# Patient Record
Sex: FEMALE | Race: WHITE | Employment: OTHER | URBAN - METROPOLITAN AREA
[De-identification: names, ages, dates, MRNs, and addresses within clinical notes are randomized per-mention and may not be internally consistent; named-entity substitution may affect disease eponyms.]

---

## 2019-10-04 ENCOUNTER — APPOINTMENT (OUTPATIENT)
Dept: GENERAL RADIOLOGY | Facility: CLINIC | Age: 69
End: 2019-10-04
Attending: EMERGENCY MEDICINE
Payer: COMMERCIAL

## 2019-10-04 ENCOUNTER — HOSPITAL ENCOUNTER (EMERGENCY)
Facility: CLINIC | Age: 69
Discharge: HOME | End: 2019-10-04
Attending: EMERGENCY MEDICINE | Admitting: EMERGENCY MEDICINE
Payer: COMMERCIAL

## 2019-10-04 ENCOUNTER — APPOINTMENT (OUTPATIENT)
Dept: CT IMAGING | Facility: CLINIC | Age: 69
End: 2019-10-04
Attending: EMERGENCY MEDICINE
Payer: COMMERCIAL

## 2019-10-04 VITALS
OXYGEN SATURATION: 97 % | SYSTOLIC BLOOD PRESSURE: 155 MMHG | TEMPERATURE: 98.3 F | RESPIRATION RATE: 18 BRPM | DIASTOLIC BLOOD PRESSURE: 74 MMHG | HEART RATE: 88 BPM

## 2019-10-04 DIAGNOSIS — R74.8 ELEVATED LIVER ENZYMES: ICD-10-CM

## 2019-10-04 DIAGNOSIS — R74.8 ELEVATED LIPASE: ICD-10-CM

## 2019-10-04 DIAGNOSIS — E80.6 HYPERBILIRUBINEMIA: ICD-10-CM

## 2019-10-04 DIAGNOSIS — K31.89 GASTRIC WALL THICKENING: ICD-10-CM

## 2019-10-04 LAB
ALBUMIN SERPL-MCNC: 3.5 G/DL (ref 3.4–5)
ALP SERPL-CCNC: 291 U/L (ref 40–150)
ALT SERPL W P-5'-P-CCNC: 298 U/L (ref 0–50)
ANION GAP SERPL CALCULATED.3IONS-SCNC: 6 MMOL/L (ref 3–14)
AST SERPL W P-5'-P-CCNC: 428 U/L (ref 0–45)
BASOPHILS # BLD AUTO: 0 10E9/L (ref 0–0.2)
BASOPHILS NFR BLD AUTO: 0.1 %
BILIRUB SERPL-MCNC: 1.4 MG/DL (ref 0.2–1.3)
BUN SERPL-MCNC: 19 MG/DL (ref 7–30)
CALCIUM SERPL-MCNC: 10.1 MG/DL (ref 8.5–10.1)
CHLORIDE SERPL-SCNC: 103 MMOL/L (ref 94–109)
CO2 SERPL-SCNC: 28 MMOL/L (ref 20–32)
CREAT SERPL-MCNC: 0.66 MG/DL (ref 0.52–1.04)
D DIMER PPP FEU-MCNC: 0.3 UG/ML FEU (ref 0–0.5)
DIFFERENTIAL METHOD BLD: ABNORMAL
EOSINOPHIL # BLD AUTO: 0.2 10E9/L (ref 0–0.7)
EOSINOPHIL NFR BLD AUTO: 1.1 %
ERYTHROCYTE [DISTWIDTH] IN BLOOD BY AUTOMATED COUNT: 14.3 % (ref 10–15)
GFR SERPL CREATININE-BSD FRML MDRD: >90 ML/MIN/{1.73_M2}
GLUCOSE SERPL-MCNC: 196 MG/DL (ref 70–99)
HCT VFR BLD AUTO: 44.9 % (ref 35–47)
HGB BLD-MCNC: 14.9 G/DL (ref 11.7–15.7)
IMM GRANULOCYTES # BLD: 0.1 10E9/L (ref 0–0.4)
IMM GRANULOCYTES NFR BLD: 0.8 %
INTERPRETATION ECG - MUSE: NORMAL
LIPASE SERPL-CCNC: 453 U/L (ref 73–393)
LYMPHOCYTES # BLD AUTO: 4 10E9/L (ref 0.8–5.3)
LYMPHOCYTES NFR BLD AUTO: 26.1 %
MCH RBC QN AUTO: 27.8 PG (ref 26.5–33)
MCHC RBC AUTO-ENTMCNC: 33.2 G/DL (ref 31.5–36.5)
MCV RBC AUTO: 84 FL (ref 78–100)
MONOCYTES # BLD AUTO: 0.9 10E9/L (ref 0–1.3)
MONOCYTES NFR BLD AUTO: 5.9 %
NEUTROPHILS # BLD AUTO: 10 10E9/L (ref 1.6–8.3)
NEUTROPHILS NFR BLD AUTO: 66 %
NRBC # BLD AUTO: 0 10*3/UL
NRBC BLD AUTO-RTO: 0 /100
PLATELET # BLD AUTO: 270 10E9/L (ref 150–450)
POTASSIUM SERPL-SCNC: 3.8 MMOL/L (ref 3.4–5.3)
PROT SERPL-MCNC: 7.8 G/DL (ref 6.8–8.8)
RBC # BLD AUTO: 5.36 10E12/L (ref 3.8–5.2)
SODIUM SERPL-SCNC: 137 MMOL/L (ref 133–144)
TROPONIN I SERPL-MCNC: <0.015 UG/L (ref 0–0.04)
WBC # BLD AUTO: 15.1 10E9/L (ref 4–11)

## 2019-10-04 PROCEDURE — 25000128 H RX IP 250 OP 636: Performed by: EMERGENCY MEDICINE

## 2019-10-04 PROCEDURE — 25000125 ZZHC RX 250: Performed by: EMERGENCY MEDICINE

## 2019-10-04 PROCEDURE — 84484 ASSAY OF TROPONIN QUANT: CPT | Performed by: EMERGENCY MEDICINE

## 2019-10-04 PROCEDURE — 85025 COMPLETE CBC W/AUTO DIFF WBC: CPT | Performed by: EMERGENCY MEDICINE

## 2019-10-04 PROCEDURE — 83690 ASSAY OF LIPASE: CPT | Performed by: EMERGENCY MEDICINE

## 2019-10-04 PROCEDURE — 74177 CT ABD & PELVIS W/CONTRAST: CPT

## 2019-10-04 PROCEDURE — 71046 X-RAY EXAM CHEST 2 VIEWS: CPT

## 2019-10-04 PROCEDURE — 85379 FIBRIN DEGRADATION QUANT: CPT | Performed by: EMERGENCY MEDICINE

## 2019-10-04 PROCEDURE — 93005 ELECTROCARDIOGRAM TRACING: CPT

## 2019-10-04 PROCEDURE — 80053 COMPREHEN METABOLIC PANEL: CPT | Performed by: EMERGENCY MEDICINE

## 2019-10-04 PROCEDURE — 99285 EMERGENCY DEPT VISIT HI MDM: CPT | Mod: 25

## 2019-10-04 PROCEDURE — 36415 COLL VENOUS BLD VENIPUNCTURE: CPT | Performed by: EMERGENCY MEDICINE

## 2019-10-04 RX ORDER — LISINOPRIL/HYDROCHLOROTHIAZIDE 10-12.5 MG
1 TABLET ORAL DAILY
COMMUNITY

## 2019-10-04 RX ORDER — ATORVASTATIN CALCIUM 40 MG/1
40 TABLET, FILM COATED ORAL AT BEDTIME
COMMUNITY

## 2019-10-04 RX ORDER — IOPAMIDOL 755 MG/ML
95 INJECTION, SOLUTION INTRAVASCULAR ONCE
Status: COMPLETED | OUTPATIENT
Start: 2019-10-04 | End: 2019-10-04

## 2019-10-04 RX ADMIN — SODIUM CHLORIDE 69 ML: 9 INJECTION, SOLUTION INTRAVENOUS at 20:01

## 2019-10-04 RX ADMIN — IOPAMIDOL 95 ML: 755 INJECTION, SOLUTION INTRAVENOUS at 20:01

## 2019-10-04 ASSESSMENT — ENCOUNTER SYMPTOMS
BACK PAIN: 1
FEVER: 0
UNEXPECTED WEIGHT CHANGE: 0
BLOOD IN STOOL: 0
VOMITING: 0
CHILLS: 0
FATIGUE: 1
LIGHT-HEADEDNESS: 1
DIARRHEA: 0
ABDOMINAL PAIN: 0
NAUSEA: 0
CONSTIPATION: 0
DIAPHORESIS: 1
DYSURIA: 0
NECK PAIN: 1

## 2019-10-04 NOTE — ED TRIAGE NOTES
Pain started on R back ribs, changed to epigastric area, and now has shooting pain from epigastric area to L shoulder with deep breathing. Profusely sweating

## 2019-10-04 NOTE — ED AVS SNAPSHOT
Emergency Department  [address] [place] [id number]  Phone:  [phone]  Fax:  [phone]                                    Natasha Vincent   MRN: [id number]    Department:   Emergency Department   Date of Visit:  10/4/2019           After Visit Summary Signature Page    I have received my discharge instructions, and my questions have been answered. I have discussed any challenges I see with this plan with the nurse or doctor.    ..........................................................................................................................................  Patient/Patient Representative Signature      ..........................................................................................................................................  Patient Representative Print Name and Relationship to Patient    ..................................................               ................................................  Date                                   Time    ..........................................................................................................................................  Reviewed by Signature/Title    ...................................................              ..............................................  Date                                               Time          22EPIC Rev 08/18

## 2019-10-05 NOTE — ED PROVIDER NOTES
History     Chief Complaint:  Back pain     HPI  A family member was used as an  to obtain the below history as well as to explain diagnosis, plan of treatment, reasons to return to the emergency department and appropriate follow up.  Natasha Vincent is a 68 year old years old female with a history of hypertension, hyperlipidemia, and diabetes mellitus who presents with back pain. This morning, the patient awoke to back pain on her right side just inferior to her scapula. She has since had this pain continue and travel around to her chest and her left neck. She has never had pain like this before thus she has visited the ER. Here, She denies reports diaphoresis, lightheadedness and fatigue. She denies any abdominal pain, trauma/fall/injury, nausea, vomiting, fever, chills, diarrhea, constipation, blood in stool, dysuria, or unexpected weight change. She reports a history of a cholecystectomy. The patient is not an avid exerciser.     CARDIAC RISK FACTORS:  Sex:    Female   Tobacco:   Negative   Hypertension:   Positive   Hyperlipidemia:  Positive   Diabetes:   Positive   Family History:  Unknown     PE/DVT RISK FACTORS:  Sex:    Female   Hormones:   Negative   Tobacco:   Negative   Cancer:   Negative   Travel:   Positive, flew from [place]   Surgery:   Negative   Other immobilization: Negative   Personal history:  Negative   Family history:  Negative     Allergies:  Patient has no known allergies.    Medications:    atorvastatin (LIPITOR) 20 MG tablet  insulin aspart (NOVOLOG PEN) 100 UNIT/ML pen  lisinopril-hydrochlorothiazide (PRINZIDE/ZESTORETIC) 10-12.5 MG tablet  metFORMIN (GLUCOPHAGE) 1000 MG tablet     Past Medical History:    Diabetes mellitus   Hyperlipidemia   Hypertension      Past Surgical History:    The patient does not have any pertinent past surgical history.    Family History:    No past pertinent family history.    Social History:  Marital Status:  Unknown   Smoker:   Never    Smokeless:   Never   Alcohol:   None   Drugs:   None   Arrives with:   Family     Review of Systems   Constitutional: Positive for diaphoresis and fatigue. Negative for chills, fever and unexpected weight change.   Cardiovascular: Positive for chest pain.   Gastrointestinal: Negative for abdominal pain, blood in stool, constipation, diarrhea, nausea and vomiting.   Genitourinary: Negative for dysuria.   Musculoskeletal: Positive for back pain and neck pain.   Neurological: Positive for light-headedness.   All other systems reviewed and are negative.    Physical Exam     Patient Vitals for the past 24 hrs:   BP Temp Temp src Pulse Resp SpO2   10/04/19 1706 (!) 155/74 98.3  F (36.8  C) Oral 88 18 97 %     Physical Exam  SKIN:  Warm, dry.  No jaundice.  No ecchymoses about the torso.  HEMATOLOGIC/IMMUNOLOGIC/LYMPHATIC:  No pallor.  No extremity edema.  HENT:  No JVD.  EYES:  Conjunctivae normal.  Anicteric.  CARDIOVASCULAR:  Regular rate and rhythm.  No murmur.  No rub.  RESPIRATORY:  No respiratory distress, breath sounds equal and normal.  GASTROINTESTINAL:  Soft, nontender abdomen with active bowel sounds.  No distention.  No palpable mass.  No hepatosplenomegaly.  MUSCULOSKELETAL: Normal body habitus.  NEUROLOGIC:  Alert, conversant.  PSYCHIATRIC:  Normal mood.    Emergency Department Course   ECG:  Indication: chest pain   Time: 1714  Vent. Rate 84 bpm. FL interval 138. QRS duration 78. QT/QTc 348/411. P-R-T axis 56 70 58. Normal sinus rhythm. Normal ECG. Read time: 1715    Imaging:  Radiographic findings were communicated with the patient and family who voiced understanding of the findings.    XR Chest PA & LAT:   No acute cardiopulmonary disease. as per radiology.     CT Abdomen/Pelvis with IV contrast:   1. Nonspecific fold thickening of the greater curvature of the  stomach. This could represent gastritis. Underlying gastric neoplastic  etiology cannot be excluded based on this exam.  2. No other acute  abnormality. as per radiology.    Laboratory:  CBC: WBC: 15.1, HGB: 14.9, PLT: 270  CMP: Glucose 196, bilirubin 1.4, alkaline phosphate 291, , , o/w WNL (Creatinine: 0.66)  D-Dimer: 0.3  1819 Troponin: <0.015   Lipase: 453    Emergency Department Course:  1724 I performed an exam of the patient as documented above.   1857 I rechecked the patient and discussed the results of their workup thus far.     Findings and plan explained. Patient discharged home with instructions regarding supportive care and reasons to return. The importance of close follow-up was reviewed. I personally reviewed the workup results with them and answered all related questions prior to discharge.    Impression & Plan    Medical Decision Making:  Natasha Vincent is a 68 year old years old female who presents with epigastric, chest, shoulder, and right thoracic pain. Feeling much improved at this point. She states she had a cholecystectomy many years ago and she does have a large healed incision in the right upper quadrant which would correlate with that. Cardiopulmonary testing was relatively unrevealing. No evidence of ACS. Negative d-dimer so pulmonary embolism was not pursued given my relatively low suspicion for that. Given the elevated transaminases, lipase, and white count, with elevated bilirubin, biliary tract or liver disease was a concern. As a result, a CT scan was performed which revealed a thickened gastric wall but nothing else specifically pathologic. Could be nonspecific versus early neoplastic changes of the stomach. In the meantime, the patient continued to improve and had negligible symptoms at the end of the visit so I think she can be discharged for close follow up and I advised follow up with Dr. Marinelli as she will likely need upper endoscopy to define the stomach thickening and the other abnormal test findings. Pending follow up, I advised return here over the weekend if feeling worse or any new concerns.      Diagnosis:    ICD-10-CM    1. Elevated liver enzymes R74.8    2. Elevated lipase R74.8    3. Hyperbilirubinemia E80.6    4. Gastric wall thickening K31.89      Disposition:  discharged to home    Scribe Disclosure:  I, Aroldo Quinteros, am serving as a scribe on 10/4/2019 at 5:24 PM to personally document services performed by Walt Crump MD based on my observations and the provider's statements to me.      Walt Crump MD  10/04/19 7003

## 2019-10-18 ENCOUNTER — HOSPITAL ENCOUNTER (INPATIENT)
Facility: CLINIC | Age: 69
LOS: 3 days | Discharge: HOME | DRG: 438 | End: 2019-10-21
Attending: EMERGENCY MEDICINE | Admitting: INTERNAL MEDICINE
Payer: COMMERCIAL

## 2019-10-18 DIAGNOSIS — K91.89 POST-ERCP ACUTE PANCREATITIS: ICD-10-CM

## 2019-10-18 DIAGNOSIS — K85.10 ACUTE BILIARY PANCREATITIS, UNSPECIFIED COMPLICATION STATUS: ICD-10-CM

## 2019-10-18 DIAGNOSIS — K85.90 POST-ERCP ACUTE PANCREATITIS: ICD-10-CM

## 2019-10-18 LAB
ALBUMIN SERPL-MCNC: 3.2 G/DL (ref 3.4–5)
ALP SERPL-CCNC: 328 U/L (ref 40–150)
ALT SERPL W P-5'-P-CCNC: 313 U/L (ref 0–50)
ANION GAP SERPL CALCULATED.3IONS-SCNC: 7 MMOL/L (ref 3–14)
AST SERPL W P-5'-P-CCNC: 566 U/L (ref 0–45)
BASOPHILS # BLD AUTO: 0 10E9/L (ref 0–0.2)
BASOPHILS NFR BLD AUTO: 0.1 %
BILIRUB SERPL-MCNC: 2.1 MG/DL (ref 0.2–1.3)
BUN SERPL-MCNC: 12 MG/DL (ref 7–30)
CALCIUM SERPL-MCNC: 9 MG/DL (ref 8.5–10.1)
CHLORIDE SERPL-SCNC: 102 MMOL/L (ref 94–109)
CO2 SERPL-SCNC: 27 MMOL/L (ref 20–32)
CREAT SERPL-MCNC: 0.51 MG/DL (ref 0.52–1.04)
DIFFERENTIAL METHOD BLD: ABNORMAL
EOSINOPHIL # BLD AUTO: 0 10E9/L (ref 0–0.7)
EOSINOPHIL NFR BLD AUTO: 0.1 %
ERYTHROCYTE [DISTWIDTH] IN BLOOD BY AUTOMATED COUNT: 13.6 % (ref 10–15)
GFR SERPL CREATININE-BSD FRML MDRD: >90 ML/MIN/{1.73_M2}
GLUCOSE BLDC GLUCOMTR-MCNC: 159 MG/DL (ref 70–99)
GLUCOSE SERPL-MCNC: 193 MG/DL (ref 70–99)
HCT VFR BLD AUTO: 39.7 % (ref 35–47)
HGB BLD-MCNC: 13.3 G/DL (ref 11.7–15.7)
IMM GRANULOCYTES # BLD: 0 10E9/L (ref 0–0.4)
IMM GRANULOCYTES NFR BLD: 0.3 %
LIPASE SERPL-CCNC: 1004 U/L (ref 73–393)
LYMPHOCYTES # BLD AUTO: 0.9 10E9/L (ref 0.8–5.3)
LYMPHOCYTES NFR BLD AUTO: 6.1 %
MCH RBC QN AUTO: 28.5 PG (ref 26.5–33)
MCHC RBC AUTO-ENTMCNC: 33.5 G/DL (ref 31.5–36.5)
MCV RBC AUTO: 85 FL (ref 78–100)
MONOCYTES # BLD AUTO: 0.3 10E9/L (ref 0–1.3)
MONOCYTES NFR BLD AUTO: 2.3 %
NEUTROPHILS # BLD AUTO: 13.1 10E9/L (ref 1.6–8.3)
NEUTROPHILS NFR BLD AUTO: 91.1 %
NRBC # BLD AUTO: 0 10*3/UL
NRBC BLD AUTO-RTO: 0 /100
PLATELET # BLD AUTO: 246 10E9/L (ref 150–450)
POTASSIUM SERPL-SCNC: 3.4 MMOL/L (ref 3.4–5.3)
PROT SERPL-MCNC: 7.3 G/DL (ref 6.8–8.8)
RBC # BLD AUTO: 4.67 10E12/L (ref 3.8–5.2)
SODIUM SERPL-SCNC: 136 MMOL/L (ref 133–144)
WBC # BLD AUTO: 14.4 10E9/L (ref 4–11)

## 2019-10-18 PROCEDURE — 25000128 H RX IP 250 OP 636: Performed by: EMERGENCY MEDICINE

## 2019-10-18 PROCEDURE — 99205 OFFICE O/P NEW HI 60 MIN: CPT | Performed by: INTERNAL MEDICINE

## 2019-10-18 PROCEDURE — 25800030 ZZH RX IP 258 OP 636: Performed by: EMERGENCY MEDICINE

## 2019-10-18 PROCEDURE — 96375 TX/PRO/DX INJ NEW DRUG ADDON: CPT

## 2019-10-18 PROCEDURE — 85025 COMPLETE CBC W/AUTO DIFF WBC: CPT | Performed by: EMERGENCY MEDICINE

## 2019-10-18 PROCEDURE — 83690 ASSAY OF LIPASE: CPT | Performed by: EMERGENCY MEDICINE

## 2019-10-18 PROCEDURE — 99285 EMERGENCY DEPT VISIT HI MDM: CPT | Mod: 25

## 2019-10-18 PROCEDURE — 00000146 ZZHCL STATISTIC GLUCOSE BY METER IP

## 2019-10-18 PROCEDURE — 80053 COMPREHEN METABOLIC PANEL: CPT | Performed by: EMERGENCY MEDICINE

## 2019-10-18 PROCEDURE — 12000000 ZZH R&B MED SURG/OB

## 2019-10-18 PROCEDURE — 96361 HYDRATE IV INFUSION ADD-ON: CPT

## 2019-10-18 PROCEDURE — 96374 THER/PROPH/DIAG INJ IV PUSH: CPT

## 2019-10-18 PROCEDURE — 99207 ZZC CDG-CODE CATEGORY CHANGED: CPT | Performed by: INTERNAL MEDICINE

## 2019-10-18 RX ORDER — ONDANSETRON 2 MG/ML
4 INJECTION INTRAMUSCULAR; INTRAVENOUS EVERY 30 MIN PRN
Status: DISCONTINUED | OUTPATIENT
Start: 2019-10-18 | End: 2019-10-19

## 2019-10-18 RX ORDER — PANTOPRAZOLE SODIUM 40 MG/1
40 TABLET, DELAYED RELEASE ORAL DAILY
COMMUNITY

## 2019-10-18 RX ORDER — HYDROMORPHONE HYDROCHLORIDE 1 MG/ML
0.5 INJECTION, SOLUTION INTRAMUSCULAR; INTRAVENOUS; SUBCUTANEOUS
Status: DISCONTINUED | OUTPATIENT
Start: 2019-10-18 | End: 2019-10-19

## 2019-10-18 RX ORDER — SODIUM CHLORIDE 9 MG/ML
1000 INJECTION, SOLUTION INTRAVENOUS CONTINUOUS
Status: DISCONTINUED | OUTPATIENT
Start: 2019-10-18 | End: 2019-10-19

## 2019-10-18 RX ADMIN — ONDANSETRON 4 MG: 2 INJECTION INTRAMUSCULAR; INTRAVENOUS at 21:08

## 2019-10-18 RX ADMIN — HYDROMORPHONE HYDROCHLORIDE 0.5 MG: 1 INJECTION, SOLUTION INTRAMUSCULAR; INTRAVENOUS; SUBCUTANEOUS at 21:08

## 2019-10-18 RX ADMIN — SODIUM CHLORIDE 1000 ML: 9 INJECTION, SOLUTION INTRAVENOUS at 21:05

## 2019-10-18 RX ADMIN — SODIUM CHLORIDE 1000 ML: 9 INJECTION, SOLUTION INTRAVENOUS at 23:07

## 2019-10-18 ASSESSMENT — ENCOUNTER SYMPTOMS
HEMATURIA: 0
NAUSEA: 1
CHILLS: 0
DYSURIA: 0
VOMITING: 1
ABDOMINAL PAIN: 1
FREQUENCY: 0

## 2019-10-18 ASSESSMENT — MIFFLIN-ST. JEOR: SCORE: 1513.84

## 2019-10-19 ENCOUNTER — APPOINTMENT (OUTPATIENT)
Dept: CT IMAGING | Facility: CLINIC | Age: 69
DRG: 438 | End: 2019-10-19
Attending: INTERNAL MEDICINE
Payer: COMMERCIAL

## 2019-10-19 PROBLEM — K91.89 POST-ERCP ACUTE PANCREATITIS: Status: ACTIVE | Noted: 2019-10-19

## 2019-10-19 PROBLEM — K85.90 POST-ERCP ACUTE PANCREATITIS: Status: ACTIVE | Noted: 2019-10-19

## 2019-10-19 LAB
ALBUMIN SERPL-MCNC: 2.8 G/DL (ref 3.4–5)
ALBUMIN UR-MCNC: 10 MG/DL
ALP SERPL-CCNC: 336 U/L (ref 40–150)
ALT SERPL W P-5'-P-CCNC: 359 U/L (ref 0–50)
ANION GAP SERPL CALCULATED.3IONS-SCNC: 5 MMOL/L (ref 3–14)
APPEARANCE UR: CLEAR
AST SERPL W P-5'-P-CCNC: 343 U/L (ref 0–45)
BILIRUB SERPL-MCNC: 3.8 MG/DL (ref 0.2–1.3)
BILIRUB UR QL STRIP: ABNORMAL
BUN SERPL-MCNC: 11 MG/DL (ref 7–30)
CALCIUM SERPL-MCNC: 8.4 MG/DL (ref 8.5–10.1)
CHLORIDE SERPL-SCNC: 108 MMOL/L (ref 94–109)
CO2 SERPL-SCNC: 25 MMOL/L (ref 20–32)
COLOR UR AUTO: ABNORMAL
CREAT SERPL-MCNC: 0.51 MG/DL (ref 0.52–1.04)
ERYTHROCYTE [DISTWIDTH] IN BLOOD BY AUTOMATED COUNT: 13.8 % (ref 10–15)
GFR SERPL CREATININE-BSD FRML MDRD: >90 ML/MIN/{1.73_M2}
GLUCOSE BLDC GLUCOMTR-MCNC: 104 MG/DL (ref 70–99)
GLUCOSE BLDC GLUCOMTR-MCNC: 117 MG/DL (ref 70–99)
GLUCOSE BLDC GLUCOMTR-MCNC: 120 MG/DL (ref 70–99)
GLUCOSE BLDC GLUCOMTR-MCNC: 170 MG/DL (ref 70–99)
GLUCOSE SERPL-MCNC: 152 MG/DL (ref 70–99)
GLUCOSE UR STRIP-MCNC: NEGATIVE MG/DL
HBA1C MFR BLD: 9.4 % (ref 0–5.6)
HCT VFR BLD AUTO: 38 % (ref 35–47)
HGB BLD-MCNC: 12.3 G/DL (ref 11.7–15.7)
HGB UR QL STRIP: NEGATIVE
KETONES UR STRIP-MCNC: NEGATIVE MG/DL
LEUKOCYTE ESTERASE UR QL STRIP: ABNORMAL
LIPASE SERPL-CCNC: 858 U/L (ref 73–393)
MCH RBC QN AUTO: 28 PG (ref 26.5–33)
MCHC RBC AUTO-ENTMCNC: 32.4 G/DL (ref 31.5–36.5)
MCV RBC AUTO: 86 FL (ref 78–100)
NITRATE UR QL: NEGATIVE
PH UR STRIP: 7.5 PH (ref 5–7)
PLATELET # BLD AUTO: 195 10E9/L (ref 150–450)
POTASSIUM SERPL-SCNC: 3.7 MMOL/L (ref 3.4–5.3)
PROT SERPL-MCNC: 6.6 G/DL (ref 6.8–8.8)
RBC # BLD AUTO: 4.4 10E12/L (ref 3.8–5.2)
RBC #/AREA URNS AUTO: 1 /HPF (ref 0–2)
SODIUM SERPL-SCNC: 138 MMOL/L (ref 133–144)
SOURCE: ABNORMAL
SP GR UR STRIP: >1.035 (ref 1–1.03)
SQUAMOUS #/AREA URNS AUTO: <1 /HPF (ref 0–1)
TRANS CELLS #/AREA URNS HPF: <1 /HPF (ref 0–1)
UROBILINOGEN UR STRIP-MCNC: 2 MG/DL (ref 0–2)
WBC # BLD AUTO: 17.3 10E9/L (ref 4–11)
WBC #/AREA URNS AUTO: 10 /HPF (ref 0–5)

## 2019-10-19 PROCEDURE — 25800030 ZZH RX IP 258 OP 636: Performed by: INTERNAL MEDICINE

## 2019-10-19 PROCEDURE — 83690 ASSAY OF LIPASE: CPT | Performed by: INTERNAL MEDICINE

## 2019-10-19 PROCEDURE — 81001 URINALYSIS AUTO W/SCOPE: CPT | Performed by: INTERNAL MEDICINE

## 2019-10-19 PROCEDURE — 83036 HEMOGLOBIN GLYCOSYLATED A1C: CPT | Performed by: INTERNAL MEDICINE

## 2019-10-19 PROCEDURE — 87086 URINE CULTURE/COLONY COUNT: CPT | Performed by: INTERNAL MEDICINE

## 2019-10-19 PROCEDURE — 36415 COLL VENOUS BLD VENIPUNCTURE: CPT | Performed by: INTERNAL MEDICINE

## 2019-10-19 PROCEDURE — 12000000 ZZH R&B MED SURG/OB

## 2019-10-19 PROCEDURE — 00000146 ZZHCL STATISTIC GLUCOSE BY METER IP

## 2019-10-19 PROCEDURE — 25000125 ZZHC RX 250: Performed by: INTERNAL MEDICINE

## 2019-10-19 PROCEDURE — 87040 BLOOD CULTURE FOR BACTERIA: CPT | Performed by: HOSPITALIST

## 2019-10-19 PROCEDURE — 25000128 H RX IP 250 OP 636: Performed by: INTERNAL MEDICINE

## 2019-10-19 PROCEDURE — 36415 COLL VENOUS BLD VENIPUNCTURE: CPT | Performed by: HOSPITALIST

## 2019-10-19 PROCEDURE — 25000131 ZZH RX MED GY IP 250 OP 636 PS 637: Mod: GY | Performed by: INTERNAL MEDICINE

## 2019-10-19 PROCEDURE — 25000128 H RX IP 250 OP 636: Performed by: EMERGENCY MEDICINE

## 2019-10-19 PROCEDURE — 85027 COMPLETE CBC AUTOMATED: CPT | Performed by: INTERNAL MEDICINE

## 2019-10-19 PROCEDURE — C9113 INJ PANTOPRAZOLE SODIUM, VIA: HCPCS | Performed by: INTERNAL MEDICINE

## 2019-10-19 PROCEDURE — 80053 COMPREHEN METABOLIC PANEL: CPT | Performed by: INTERNAL MEDICINE

## 2019-10-19 PROCEDURE — 74177 CT ABD & PELVIS W/CONTRAST: CPT

## 2019-10-19 PROCEDURE — 99232 SBSQ HOSP IP/OBS MODERATE 35: CPT | Performed by: INTERNAL MEDICINE

## 2019-10-19 RX ORDER — ACETAMINOPHEN 325 MG/1
650 TABLET ORAL EVERY 4 HOURS PRN
Status: DISCONTINUED | OUTPATIENT
Start: 2019-10-19 | End: 2019-10-22 | Stop reason: HOSPADM

## 2019-10-19 RX ORDER — ACETAMINOPHEN 650 MG/1
650 SUPPOSITORY RECTAL EVERY 4 HOURS PRN
Status: DISCONTINUED | OUTPATIENT
Start: 2019-10-19 | End: 2019-10-19

## 2019-10-19 RX ORDER — POTASSIUM CHLORIDE 1500 MG/1
20-40 TABLET, EXTENDED RELEASE ORAL
Status: DISCONTINUED | OUTPATIENT
Start: 2019-10-19 | End: 2019-10-22 | Stop reason: HOSPADM

## 2019-10-19 RX ORDER — DEXTROSE MONOHYDRATE 25 G/50ML
25-50 INJECTION, SOLUTION INTRAVENOUS
Status: DISCONTINUED | OUTPATIENT
Start: 2019-10-19 | End: 2019-10-22 | Stop reason: HOSPADM

## 2019-10-19 RX ORDER — ACETAMINOPHEN 325 MG/1
650 TABLET ORAL EVERY 4 HOURS PRN
Status: DISCONTINUED | OUTPATIENT
Start: 2019-10-19 | End: 2019-10-19

## 2019-10-19 RX ORDER — POTASSIUM CHLORIDE 29.8 MG/ML
20 INJECTION INTRAVENOUS
Status: DISCONTINUED | OUTPATIENT
Start: 2019-10-19 | End: 2019-10-22 | Stop reason: HOSPADM

## 2019-10-19 RX ORDER — IOPAMIDOL 755 MG/ML
113 INJECTION, SOLUTION INTRAVASCULAR ONCE
Status: COMPLETED | OUTPATIENT
Start: 2019-10-19 | End: 2019-10-19

## 2019-10-19 RX ORDER — ONDANSETRON 2 MG/ML
4 INJECTION INTRAMUSCULAR; INTRAVENOUS EVERY 6 HOURS PRN
Status: DISCONTINUED | OUTPATIENT
Start: 2019-10-19 | End: 2019-10-22 | Stop reason: HOSPADM

## 2019-10-19 RX ORDER — SODIUM CHLORIDE 9 MG/ML
INJECTION, SOLUTION INTRAVENOUS CONTINUOUS
Status: DISCONTINUED | OUTPATIENT
Start: 2019-10-19 | End: 2019-10-19 | Stop reason: ALTCHOICE

## 2019-10-19 RX ORDER — AMOXICILLIN 250 MG
2 CAPSULE ORAL 2 TIMES DAILY PRN
Status: DISCONTINUED | OUTPATIENT
Start: 2019-10-19 | End: 2019-10-22 | Stop reason: HOSPADM

## 2019-10-19 RX ORDER — POTASSIUM CHLORIDE 1.5 G/1.58G
20-40 POWDER, FOR SOLUTION ORAL
Status: DISCONTINUED | OUTPATIENT
Start: 2019-10-19 | End: 2019-10-22 | Stop reason: HOSPADM

## 2019-10-19 RX ORDER — NICOTINE POLACRILEX 4 MG
15-30 LOZENGE BUCCAL
Status: DISCONTINUED | OUTPATIENT
Start: 2019-10-19 | End: 2019-10-22 | Stop reason: HOSPADM

## 2019-10-19 RX ORDER — POTASSIUM CL/LIDO/0.9 % NACL 10MEQ/0.1L
10 INTRAVENOUS SOLUTION, PIGGYBACK (ML) INTRAVENOUS
Status: DISCONTINUED | OUTPATIENT
Start: 2019-10-19 | End: 2019-10-22 | Stop reason: HOSPADM

## 2019-10-19 RX ORDER — ONDANSETRON 4 MG/1
4 TABLET, ORALLY DISINTEGRATING ORAL EVERY 6 HOURS PRN
Status: DISCONTINUED | OUTPATIENT
Start: 2019-10-19 | End: 2019-10-22 | Stop reason: HOSPADM

## 2019-10-19 RX ORDER — SODIUM CHLORIDE, SODIUM LACTATE, POTASSIUM CHLORIDE, CALCIUM CHLORIDE 600; 310; 30; 20 MG/100ML; MG/100ML; MG/100ML; MG/100ML
INJECTION, SOLUTION INTRAVENOUS CONTINUOUS
Status: DISCONTINUED | OUTPATIENT
Start: 2019-10-19 | End: 2019-10-20

## 2019-10-19 RX ORDER — POTASSIUM CHLORIDE 7.45 MG/ML
10 INJECTION INTRAVENOUS
Status: DISCONTINUED | OUTPATIENT
Start: 2019-10-19 | End: 2019-10-22 | Stop reason: HOSPADM

## 2019-10-19 RX ORDER — HYDROMORPHONE HYDROCHLORIDE 1 MG/ML
0.2 INJECTION, SOLUTION INTRAMUSCULAR; INTRAVENOUS; SUBCUTANEOUS
Status: DISCONTINUED | OUTPATIENT
Start: 2019-10-19 | End: 2019-10-22 | Stop reason: HOSPADM

## 2019-10-19 RX ORDER — ACETAMINOPHEN 650 MG/1
650 SUPPOSITORY RECTAL EVERY 4 HOURS PRN
Status: DISCONTINUED | OUTPATIENT
Start: 2019-10-19 | End: 2019-10-22 | Stop reason: HOSPADM

## 2019-10-19 RX ORDER — NALOXONE HYDROCHLORIDE 0.4 MG/ML
.1-.4 INJECTION, SOLUTION INTRAMUSCULAR; INTRAVENOUS; SUBCUTANEOUS
Status: DISCONTINUED | OUTPATIENT
Start: 2019-10-19 | End: 2019-10-22 | Stop reason: HOSPADM

## 2019-10-19 RX ORDER — PIPERACILLIN SODIUM, TAZOBACTAM SODIUM 3; .375 G/15ML; G/15ML
3.38 INJECTION, POWDER, LYOPHILIZED, FOR SOLUTION INTRAVENOUS EVERY 6 HOURS
Status: DISCONTINUED | OUTPATIENT
Start: 2019-10-19 | End: 2019-10-22 | Stop reason: HOSPADM

## 2019-10-19 RX ORDER — AMOXICILLIN 250 MG
1 CAPSULE ORAL 2 TIMES DAILY PRN
Status: DISCONTINUED | OUTPATIENT
Start: 2019-10-19 | End: 2019-10-22 | Stop reason: HOSPADM

## 2019-10-19 RX ADMIN — PIPERACILLIN SODIUM AND TAZOBACTAM SODIUM 3.38 G: 3; .375 INJECTION, POWDER, LYOPHILIZED, FOR SOLUTION INTRAVENOUS at 16:13

## 2019-10-19 RX ADMIN — IOPAMIDOL 113 ML: 755 INJECTION, SOLUTION INTRAVENOUS at 13:04

## 2019-10-19 RX ADMIN — SODIUM CHLORIDE 74 ML: 9 INJECTION, SOLUTION INTRAVENOUS at 13:05

## 2019-10-19 RX ADMIN — SODIUM CHLORIDE, POTASSIUM CHLORIDE, SODIUM LACTATE AND CALCIUM CHLORIDE: 600; 310; 30; 20 INJECTION, SOLUTION INTRAVENOUS at 22:51

## 2019-10-19 RX ADMIN — SODIUM CHLORIDE, POTASSIUM CHLORIDE, SODIUM LACTATE AND CALCIUM CHLORIDE: 600; 310; 30; 20 INJECTION, SOLUTION INTRAVENOUS at 16:12

## 2019-10-19 RX ADMIN — SODIUM CHLORIDE: 9 INJECTION, SOLUTION INTRAVENOUS at 13:21

## 2019-10-19 RX ADMIN — INSULIN ASPART 1 UNITS: 100 INJECTION, SOLUTION INTRAVENOUS; SUBCUTANEOUS at 09:24

## 2019-10-19 RX ADMIN — ONDANSETRON 4 MG: 2 INJECTION INTRAMUSCULAR; INTRAVENOUS at 00:52

## 2019-10-19 RX ADMIN — PANTOPRAZOLE SODIUM 40 MG: 40 INJECTION, POWDER, FOR SOLUTION INTRAVENOUS at 09:24

## 2019-10-19 RX ADMIN — SODIUM CHLORIDE: 9 INJECTION, SOLUTION INTRAVENOUS at 01:28

## 2019-10-19 RX ADMIN — HYDROMORPHONE HYDROCHLORIDE 0.2 MG: 1 INJECTION, SOLUTION INTRAMUSCULAR; INTRAVENOUS; SUBCUTANEOUS at 11:07

## 2019-10-19 RX ADMIN — SODIUM CHLORIDE: 9 INJECTION, SOLUTION INTRAVENOUS at 05:16

## 2019-10-19 RX ADMIN — HYDROMORPHONE HYDROCHLORIDE 0.2 MG: 1 INJECTION, SOLUTION INTRAMUSCULAR; INTRAVENOUS; SUBCUTANEOUS at 05:19

## 2019-10-19 RX ADMIN — PIPERACILLIN SODIUM AND TAZOBACTAM SODIUM 3.38 G: 3; .375 INJECTION, POWDER, LYOPHILIZED, FOR SOLUTION INTRAVENOUS at 22:17

## 2019-10-19 ASSESSMENT — ACTIVITIES OF DAILY LIVING (ADL)
AMBULATION: 0-->INDEPENDENT
COGNITION: 0 - NO COGNITION ISSUES REPORTED
ADLS_ACUITY_SCORE: 10
TOILETING: 0-->INDEPENDENT
SWALLOWING: 0-->SWALLOWS FOODS/LIQUIDS WITHOUT DIFFICULTY
RETIRED_COMMUNICATION: 0-->UNDERSTANDS/COMMUNICATES WITHOUT DIFFICULTY
ADLS_ACUITY_SCORE: 10
TRANSFERRING: 0-->INDEPENDENT
BATHING: 0-->INDEPENDENT
DRESS: 0-->INDEPENDENT
ADLS_ACUITY_SCORE: 15
ADLS_ACUITY_SCORE: 10
RETIRED_EATING: 0-->INDEPENDENT
ADLS_ACUITY_SCORE: 10
FALL_HISTORY_WITHIN_LAST_SIX_MONTHS: NO

## 2019-10-19 NOTE — PLAN OF CARE
A&OX4, VSS on RA but requesting O2 for comfort. C/o mid and right UQ pain managed with IV dilaudid. Strict NPO. BG check. Most recent BG of 104. NS at 150mL/hr. CT of abd with contrast completed this shift. UA collected and resulted. Zosyn started. IVF changed to LR. Up SBA to the bathroom. GI Consult pending. Continue to monitor.

## 2019-10-19 NOTE — CONSULTS
[facility]    Gastroenterology Consultation    Date of Admission:  10/18/2019    History of Present Illness   Natasha Vincent is a 68 year old female who presents with epigastric pain, N/V after ERCP.     PMH of HTN, DM originally seen by our service as outpatient given recurrent RUQ pain after cholecystectomy which she has EUS done to confirm large 7 mm stone and later with ERCP w/ sphincterotomy, sphincter-plasty and PD stent placement to prevent pancreatitis. Despite the effort of PD stent, pt developed pancreatitis after the procedure with epigastric pain, N/V and elevated in white count. She was not able to tolerate any liquid. Denies any fever or chill, pain is located at epigastric area and radiates to the back.    Assessment & Plan   Natasha Vincent is a 68 year old female who was admitted on 10/18/2019. I was asked to see the patient for post-ERCP pancreatitis.    Pancreatitis:  -pt received 2 L in the ED  -recommend large volume resuscitation of at least 200 ml/hr of LR  -diet as tolerated, pt currently has no appetitie  -pain control    Elevated LFT:   -likely due to bile duct manipulation with instrumentation  -however given elevated in white count post-procedure, will start zosyn to prevent cholangitis and to convert to po meds later  -daily LFT    Active Problems:    Post-ERCP acute pancreatitis      Venkatesh Isbell MD  (Luis Manuel)  [facility]  Office: [phone]  Cell: [phone], please feel free to call the cell    Code Status    Full Code      Primary Care Physician   Physician No Ref-Primary      Venkatesh Isbell MD  (Luis Manuel)  [facility]  Office: [phone]  Cell: [phone], please feel free to call the cell      Past Medical History   I have reviewed this patient's medical history and updated it with pertinent information if needed.   Past Medical History:   Diagnosis Date     Diabetes (H)      Hypercholesteremia       Hypertension        Past Surgical History   I have reviewed this patient's surgical history and updated it with pertinent information if needed.  No past surgical history on file.    Prior to Admission Medications   Prior to Admission Medications   Prescriptions Last Dose Informant Patient Reported? Taking?   atorvastatin (LIPITOR) 40 MG tablet 10/17/2019 at pm  Yes Yes   Sig: Take 40 mg by mouth At Bedtime    insulin glargine (LANTUS PEN) 100 UNIT/ML pen 10/17/2019 at Unknown time  Yes Yes   Sig: Inject 20 Units Subcutaneous 2 times daily   lisinopril-hydrochlorothiazide (PRINZIDE/ZESTORETIC) 10-12.5 MG tablet 10/17/2019 at am  Yes Yes   Sig: Take 1 tablet by mouth daily   metFORMIN (GLUCOPHAGE) 1000 MG tablet 10/17/2019 at pm  Yes Yes   Sig: Take 1,000 mg by mouth 2 times daily (with meals)   pantoprazole (PROTONIX) 40 MG EC tablet 10/18/2019 at am  Yes Yes   Sig: Take 40 mg by mouth daily      Facility-Administered Medications: None     Allergies   No Known Allergies    Social History   I have reviewed this patient's social history and updated it with pertinent information if needed. Natasha Ochoaitez  reports that she has never smoked. She has never used smokeless tobacco.    Family History   I have reviewed this patient's family history and updated it with pertinent information if needed.   No family history on file.    Review of Systems   The 10 point Review of Systems is negative other than noted in the HPI or here.     Physical Exam   Temp: 99.3  F (37.4  C) Temp src: Oral BP: (!) 142/59 Pulse: 85 Heart Rate: 72 Resp: 18 SpO2: 94 % O2 Device: None (Room air) Oxygen Delivery: 1.5 LPM  Vital Signs with Ranges  Temp:  [97.3  F (36.3  C)-99.3  F (37.4  C)] 99.3  F (37.4  C)  Pulse:  [72-87] 85  Heart Rate:  [72-84] 72  Resp:  [10-18] 18  BP: (136-178)/(58-83) 142/59  SpO2:  [94 %-99 %] 94 %  225 lbs 0 oz    Exam:  Constitutional: healthy, alert and no distress  Head: Normocephalic. No masses, lesions,  tenderness or abnormalities  Neck: Neck supple. No adenopathy. Thyroid symmetric, normal size,, Carotids without bruits.  ENT: ENT exam normal, no neck nodes or sinus tenderness  Cardiovascular: negative, PMI normal. No lifts, heaves, or thrills. RRR. No murmurs, clicks gallops or rub  Respiratory: negative, Percussion normal. Good diaphragmatic excursion. Lungs clear  Gastrointestinal: Abdomen soft, tender at epigastric area. BS normal. No masses, organomegaly  : Deferred  Musculoskeletal: extremities normal- no gross deformities noted, gait normal and normal muscle tone  Skin: no suspicious lesions or rashes  Neurologic: Gait normal. Reflexes normal and symmetric. Sensation grossly WNL.  Psychiatric: mentation appears normal and affect normal/bright  Hematologic/Lymphatic/Immunologic: Normal cervical lymph nodes     Data   Results for orders placed or performed during the hospital encounter of 10/18/19 (from the past 24 hour(s))   Glucose by meter   Result Value Ref Range    Glucose 159 (H) 70 - 99 mg/dL   CBC with platelets differential   Result Value Ref Range    WBC 14.4 (H) 4.0 - 11.0 10e9/L    RBC Count 4.67 3.8 - 5.2 10e12/L    Hemoglobin 13.3 11.7 - 15.7 g/dL    Hematocrit 39.7 35.0 - 47.0 %    MCV 85 78 - 100 fl    MCH 28.5 26.5 - 33.0 pg    MCHC 33.5 31.5 - 36.5 g/dL    RDW 13.6 10.0 - 15.0 %    Platelet Count 246 150 - 450 10e9/L    Diff Method Automated Method     % Neutrophils 91.1 %    % Lymphocytes 6.1 %    % Monocytes 2.3 %    % Eosinophils 0.1 %    % Basophils 0.1 %    % Immature Granulocytes 0.3 %    Nucleated RBCs 0 0 /100    Absolute Neutrophil 13.1 (H) 1.6 - 8.3 10e9/L    Absolute Lymphocytes 0.9 0.8 - 5.3 10e9/L    Absolute Monocytes 0.3 0.0 - 1.3 10e9/L    Absolute Eosinophils 0.0 0.0 - 0.7 10e9/L    Absolute Basophils 0.0 0.0 - 0.2 10e9/L    Abs Immature Granulocytes 0.0 0 - 0.4 10e9/L    Absolute Nucleated RBC 0.0    Comprehensive metabolic panel   Result Value Ref Range    Sodium 136 133  - 144 mmol/L    Potassium 3.4 3.4 - 5.3 mmol/L    Chloride 102 94 - 109 mmol/L    Carbon Dioxide 27 20 - 32 mmol/L    Anion Gap 7 3 - 14 mmol/L    Glucose 193 (H) 70 - 99 mg/dL    Urea Nitrogen 12 7 - 30 mg/dL    Creatinine 0.51 (L) 0.52 - 1.04 mg/dL    GFR Estimate >90 >60 mL/min/[1.73_m2]    GFR Estimate If Black >90 >60 mL/min/[1.73_m2]    Calcium 9.0 8.5 - 10.1 mg/dL    Bilirubin Total 2.1 (H) 0.2 - 1.3 mg/dL    Albumin 3.2 (L) 3.4 - 5.0 g/dL    Protein Total 7.3 6.8 - 8.8 g/dL    Alkaline Phosphatase 328 (H) 40 - 150 U/L     (H) 0 - 50 U/L     (HH) 0 - 45 U/L   Lipase   Result Value Ref Range    Lipase 1,004 (H) 73 - 393 U/L   Gastroenterology IP Consult: ? post ERCP pancreatitis; Consultant may enter orders: Yes; Patient to be seen: Routine - within 24 hours; Requested Clinic/Group: Dr. Marinelli; Requesting provider? Hospitalist (if different from attending physician)    Venkatesh Shaffer MD     10/19/2019  9:25 AM  GI aware of the consult, full note to follow.    Venkatesh Isbell MD  [phone]  [facility]   Glucose by meter   Result Value Ref Range    Glucose 170 (H) 70 - 99 mg/dL   CBC with platelets   Result Value Ref Range    WBC 17.3 (H) 4.0 - 11.0 10e9/L    RBC Count 4.40 3.8 - 5.2 10e12/L    Hemoglobin 12.3 11.7 - 15.7 g/dL    Hematocrit 38.0 35.0 - 47.0 %    MCV 86 78 - 100 fl    MCH 28.0 26.5 - 33.0 pg    MCHC 32.4 31.5 - 36.5 g/dL    RDW 13.8 10.0 - 15.0 %    Platelet Count 195 150 - 450 10e9/L   Comprehensive metabolic panel   Result Value Ref Range    Sodium 138 133 - 144 mmol/L    Potassium 3.7 3.4 - 5.3 mmol/L    Chloride 108 94 - 109 mmol/L    Carbon Dioxide 25 20 - 32 mmol/L    Anion Gap 5 3 - 14 mmol/L    Glucose 152 (H) 70 - 99 mg/dL    Urea Nitrogen 11 7 - 30 mg/dL    Creatinine 0.51 (L) 0.52 - 1.04 mg/dL    GFR Estimate >90 >60 mL/min/[1.73_m2]    GFR Estimate If Black >90 >60 mL/min/[1.73_m2]    Calcium 8.4 (L) 8.5 - 10.1 mg/dL    Bilirubin Total 3.8 (H)  0.2 - 1.3 mg/dL    Albumin 2.8 (L) 3.4 - 5.0 g/dL    Protein Total 6.6 (L) 6.8 - 8.8 g/dL    Alkaline Phosphatase 336 (H) 40 - 150 U/L     (H) 0 - 50 U/L     (H) 0 - 45 U/L   Lipase   Result Value Ref Range    Lipase 858 (H) 73 - 393 U/L   Hemoglobin A1c   Result Value Ref Range    Hemoglobin A1C 9.4 (H) 0 - 5.6 %   Glucose by meter   Result Value Ref Range    Glucose 104 (H) 70 - 99 mg/dL   CT Abdomen Pelvis w Contrast    Narrative    CT ABDOMEN AND PELVIS WITH CONTRAST   10/19/2019 1:11 PM     HISTORY: Post ERCP abdominal pain and N/V and leucocytosis.    TECHNIQUE:  CT abdomen and pelvis with 113 mL [id number]     IV.  Radiation dose for this scan was reduced using automated exposure  control, adjustment of the mA and/or kV according to patient size, or  iterative reconstruction technique.    COMPARISON: None available.    FINDINGS:   Lung bases: Bibasilar atelectasis. Small hiatal hernia.    Abdomen/pelvis: No suspicious focal hepatic mass. Mild intra and extra  hepatic biliary dilatation likely reservoir effect post  cholecystectomy. Few foci of pneumobilia, likely related to recent  stent placement. The gallbladder is surgically absent. No  splenomegaly. Mild fatty replacement of the pancreas. There is a  pancreatic stent that extends from the pancreatic head into the second  portion of the duodenum through the ampulla of Vatter. Mild  peripancreatic fatty haziness near the pancreatic head and rogers  hepatis, could be related to recent procedure.    No abnormally dilated bowel loops. Colonic, predominantly sigmoid  diverticulosis without CT evidence of acute diverticular disease. No  significant free fluid in the abdomen or pelvis. No free peritoneal or  portal venous gas.    Bones and soft tissue: Moderate size fat-containing umbilical and  periumbilical hernia. Mild degenerative changes of the spine.      Impression    IMPRESSION:  1. Pancreatic stent extending from the pancreatic head into the  second  portion of the duodenum through the ampulla. Mild peripancreatic fat  stranding predominantly around the pancreatic head and the rogers  hepatis, could be related to recent ERCP.  2. Mild intra and extrahepatic biliary dilatation, likely related to  the reservoir effect postcholecystectomy. Few foci of pneumobilia,  likely related to recent ERCP.    KISHOR CARVER MD   UA with Microscopic reflex to Culture   Result Value Ref Range    Color Urine Dark Yellow     Appearance Urine Clear     Glucose Urine Negative NEG^Negative mg/dL    Bilirubin Urine Small (A) NEG^Negative    Ketones Urine Negative NEG^Negative mg/dL    Specific Gravity Urine >1.035 (H) 1.003 - 1.035    Blood Urine Negative NEG^Negative    pH Urine 7.5 (H) 5.0 - 7.0 pH    Protein Albumin Urine 10 (A) NEG^Negative mg/dL    Urobilinogen mg/dL 2.0 0.0 - 2.0 mg/dL    Nitrite Urine Negative NEG^Negative    Leukocyte Esterase Urine Small (A) NEG^Negative    Source Midstream Urine     WBC Urine 10 (H) 0 - 5 /HPF    RBC Urine 1 0 - 2 /HPF    Squamous Epithelial /HPF Urine <1 0 - 1 /HPF    Transitional Epi <1 0 - 1 /HPF

## 2019-10-19 NOTE — ED NOTES
".  [facility]  ED Nurse Handoff Report    ED Chief complaint: Abdominal Pain; Nausea & Vomiting; and Chest Pain      ED Diagnosis:   Final diagnoses:   Acute biliary pancreatitis, unspecified complication status       Code Status: as per hospitalist    Allergies: No Known Allergies    Activity level - Baseline/Home:  Independent  Activity Level - Current:   Stand with Assist of 2    Patient's Preferred language: Canadian   Needed?: Yes    Isolation: No  Infection: Not Applicable  Bariatric?: No    Vital Signs:   Vitals:    10/18/19 2036 10/18/19 2116 10/18/19 2211   BP: (!) 178/83 (!) 147/78 (!) 141/79   Pulse: 72 76 84   Resp: 18 10 16   Temp: 97.7  F (36.5  C)     TempSrc: Oral     SpO2: 99% 96% 99%   Weight:   102.1 kg (225 lb)   Height:  1.575 m (5' 2\")        Cardiac Rhythm: ,        Pain level: 0-10 Pain Scale: 3    Is this patient confused?: No   Does this patient have a guardian?  No         If yes, is there guardianship documents in the Epic \"Code/ACP\" activity?  N/A         Guardian Notified?  N/A  [place] - Suicide Severity Rating Scale Completed?  Yes  If yes, what color did the patient score?  White    Patient Report: Initial Complaint: abdominal pain, worsen after ERCP this AM  Focused Assessment: ambulatory, A&Ox4, respirations even and unlabored, skin dry warm, color wnl. Guarding abdomen, restless. Hx of DM, HTN, HLD, gallstones.  Tests Performed:   Results for orders placed or performed during the hospital encounter of 10/18/19   CBC with platelets differential   Result Value Ref Range    WBC 14.4 (H) 4.0 - 11.0 10e9/L    RBC Count 4.67 3.8 - 5.2 10e12/L    Hemoglobin 13.3 11.7 - 15.7 g/dL    Hematocrit 39.7 35.0 - 47.0 %    MCV 85 78 - 100 fl    MCH 28.5 26.5 - 33.0 pg    MCHC 33.5 31.5 - 36.5 g/dL    RDW 13.6 10.0 - 15.0 %    Platelet Count 246 150 - 450 10e9/L    Diff Method Automated Method     % Neutrophils 91.1 %    % Lymphocytes 6.1 %    % Monocytes 2.3 %    % " Eosinophils 0.1 %    % Basophils 0.1 %    % Immature Granulocytes 0.3 %    Nucleated RBCs 0 0 /100    Absolute Neutrophil 13.1 (H) 1.6 - 8.3 10e9/L    Absolute Lymphocytes 0.9 0.8 - 5.3 10e9/L    Absolute Monocytes 0.3 0.0 - 1.3 10e9/L    Absolute Eosinophils 0.0 0.0 - 0.7 10e9/L    Absolute Basophils 0.0 0.0 - 0.2 10e9/L    Abs Immature Granulocytes 0.0 0 - 0.4 10e9/L    Absolute Nucleated RBC 0.0    Comprehensive metabolic panel   Result Value Ref Range    Sodium 136 133 - 144 mmol/L    Potassium 3.4 3.4 - 5.3 mmol/L    Chloride 102 94 - 109 mmol/L    Carbon Dioxide 27 20 - 32 mmol/L    Anion Gap 7 3 - 14 mmol/L    Glucose 193 (H) 70 - 99 mg/dL    Urea Nitrogen 12 7 - 30 mg/dL    Creatinine 0.51 (L) 0.52 - 1.04 mg/dL    GFR Estimate >90 >60 mL/min/[1.73_m2]    GFR Estimate If Black >90 >60 mL/min/[1.73_m2]    Calcium 9.0 8.5 - 10.1 mg/dL    Bilirubin Total 2.1 (H) 0.2 - 1.3 mg/dL    Albumin 3.2 (L) 3.4 - 5.0 g/dL    Protein Total 7.3 6.8 - 8.8 g/dL    Alkaline Phosphatase 328 (H) 40 - 150 U/L     (H) 0 - 50 U/L     (HH) 0 - 45 U/L   Lipase   Result Value Ref Range    Lipase 1,004 (H) 73 - 393 U/L   Glucose by meter   Result Value Ref Range    Glucose 159 (H) 70 - 99 mg/dL     Abnormal Results:   Labs Ordered and Resulted from Time of ED Arrival Up to the Time of Departure from the ED   CBC WITH PLATELETS DIFFERENTIAL - Abnormal; Notable for the following components:       Result Value    WBC 14.4 (*)     Absolute Neutrophil 13.1 (*)     All other components within normal limits   COMPREHENSIVE METABOLIC PANEL - Abnormal; Notable for the following components:    Glucose 193 (*)     Creatinine 0.51 (*)     Bilirubin Total 2.1 (*)     Albumin 3.2 (*)     Alkaline Phosphatase 328 (*)      (*)      (*)     All other components within normal limits   LIPASE - Abnormal; Notable for the following components:    Lipase 1,004 (*)     All other components within normal limits   GLUCOSE BY METER -  Abnormal; Notable for the following components:    Glucose 159 (*)     All other components within normal limits     Treatments provided: pain/nausea control, fluids.    Family Comments: daughters at bedside, they prefer to interpret for patient.     OBS brochure/video discussed/provided to patient/family: Yes             ED Medications:   Medications   0.9% sodium chloride BOLUS (0 mLs Intravenous Stopped 10/18/19 2210)     Followed by   sodium chloride 0.9% infusion (has no administration in time range)   ondansetron (ZOFRAN) injection 4 mg (4 mg Intravenous Given 10/18/19 2108)   HYDROmorphone (PF) (DILAUDID) injection 0.5 mg (0.5 mg Intravenous Given 10/18/19 2108)       Drips infusing?:  Not at this moment.    For the majority of the shift this patient was Green.   Interventions performed were n/a.    Severe Sepsis OR Septic Shock Diagnosis Present: No    To be done/followed up on inpatient unit:  pain/nausea control. GI consult?    ED NURSE PHONE NUMBER: [phone]

## 2019-10-19 NOTE — PHARMACY-ADMISSION MEDICATION HISTORY
Admission medication history interview status for the 10/18/2019  admission is complete. See EPIC admission navigator for prior to admission medications     Medication history source reliability:Good    Actions taken by pharmacist (provider contacted, etc): Interviewed family - who had med list on phone and pills in purse     Additional medication history information not noted on PTA med list :None    Medication reconciliation/reorder completed by provider prior to medication history? No    Time spent in this activity: 15 minutes    Prior to Admission medications    Medication Sig Last Dose Taking? Auth Provider   atorvastatin (LIPITOR) 40 MG tablet Take 40 mg by mouth At Bedtime  10/17/2019 at pm Yes Reported, Patient   insulin glargine (LANTUS PEN) 100 UNIT/ML pen Inject 20 Units Subcutaneous 2 times daily 10/17/2019 at Unknown time Yes Unknown, Entered By History   lisinopril-hydrochlorothiazide (PRINZIDE/ZESTORETIC) 10-12.5 MG tablet Take 1 tablet by mouth daily 10/17/2019 at am Yes Reported, Patient   metFORMIN (GLUCOPHAGE) 1000 MG tablet Take 1,000 mg by mouth 2 times daily (with meals) 10/17/2019 at pm Yes Reported, Patient   pantoprazole (PROTONIX) 40 MG EC tablet Take 40 mg by mouth daily 10/18/2019 at am Yes Unknown, Entered By History     Mara Barrett, PharmD  Inpatient Clinical Pharmacist  [phone]

## 2019-10-19 NOTE — ED PROVIDER NOTES
History     Chief Complaint:  Abdominal Pain; Nausea & Vomiting; and Chest Pain    HPI   Natasha Vincent is a 68 year old female who lives with her daughter with history of insulin-controlled diabetes mellitus also on Metformin who presents with abdominal pain, nausea, vomiting, and chest pain. History is provided by daughter and grand daughter as the patient is strictly Wallisian speaking. The patient's daughter reports that the patient had an ERCP 4 days ago and again today, performed by Dr. Tejeda. They are unsure why the procedure was split between 2 days. 1 gallstone was removed from the duct. Findings showed that choledocholthiasis was found. Complete removal was accomplished by biliary sphinecteromy and balloon extraction. One temporary stent was placed into the ventral pancreatic duct. There were no complications following the procedure, per family endorsement. At home, the patient was complaining of increased pain, progressively worsening, prompting her presentation. Family reports inability to keep fluids down with 2-3 episodes of emesis. No reported fever. Denies shaking or the chills. The patient had a normal bowel movement prior to coming in. The patient does not drink or smoke. She has no history of any other surgeries, nor any history of cardiac issues, or urinary symptoms. The patient took Tylenol earlier today. She has not taken her Protonix.    Allergies:  No known drug allergies     Medications:    Atorvastatin  Novolog  Lisinopril-hydrochlorothiazide  Metformin    Past Medical History:    Diabetes  Hypercholesteremia  Hypertension     Past Surgical History:    History reviewed. No pertinent surgical history.    Family History:    History reviewed. No pertinent family history.     Social History:  Smoking status: Never smoker  Alcohol use: No  Drug use: No  Presents to the ED with her daughter and grand-daughter  Marital Status:       Review of Systems   Constitutional: Negative for  "chills.   Cardiovascular: Positive for chest pain.   Gastrointestinal: Positive for abdominal pain, nausea and vomiting.   Genitourinary: Negative for dysuria, frequency, hematuria and urgency.   All other systems reviewed and are negative.    Physical Exam     Patient Vitals for the past 24 hrs:   BP Temp Temp src Pulse Heart Rate Resp SpO2 Height   10/18/19 2116 (!) 147/78 -- -- 76 76 10 96 % 1.575 m (5' 2\")   10/18/19 2036 (!) 178/83 97.7  F (36.5  C) Oral 72 72 18 99 % --       Physical Exam  Constitutional: Heavy set,  female.  HENT: No signs of trauma.   Eyes: EOM are normal. Pupils are equal, round, and reactive to light.   Neck: Normal range of motion. No JVD present. No cervical adenopathy.  Cardiovascular: Regular rhythm.  Exam reveals no gallop and no friction rub.    No murmur heard.  Pulmonary/Chest: Bilateral breath sounds normal. No wheezes, rhonchi or rales.  Abdominal: RUQ incision. Diffuse abdominal tenderness, worse in the upper abdomen. Bowel sounds are active. 2+ femoral pulses. Soft. No rebound or guarding.   Musculoskeletal: No edema. No tenderness.   Lymphadenopathy: No lymphadenopathy.   Neurological: Alert and oriented to person, place, and time. Normal strength. Coordination normal.   Skin: Skin is warm and dry. No rash noted. No erythema.     Emergency Department Course   Laboratory:  CBC: WBC 14.4, HGB 13.3,   CMP: Glucose 193, Creatinine 0.51, Bilirubin Total 2.1, Albumin 3.2, Alkphos 328, Alt 313, Ast 566  Lipase: 1,004  Glucose by meter: 159    Interventions:  2105: NS 1L IV Bolus  2108: 4 mg Zofran IV  2108: 0.5 mg Dilaudid IV  2307: NS 1L IV Bolus    Emergency Department Course:  Past medical records, nursing notes, and vitals reviewed.  2051: I performed an exam of the patient and obtained history, as documented above.      IV inserted and blood drawn.    2244: I rechecked the patient. Explained findings to patient and family.    Findings and plan explained to the " patient and her family who consents to admission.     2248: Discussed the patient with Dr. Coon, who will admit the patient to a medical bed for further monitoring, evaluation, and treatment.    Impression & Plan    Medical Decision Making:  Natasha Vincent is a 68 year old female who presents with abdominal pain, nausea, and vomiting. She underwent ERCP today and was found to have a 7 mm bile duct stone, which was removed with sphinecteromy and a stent was placed in the pancreatic duct, as well. During the course of the day she began having more pain, more nausea, and more vomiting, but no fevers. On exam, she is quite uncomfortable. She has upper abdominal tenderness. Labs revealed elevated liver functions, as well lipase over 1,000. Patient has received IV fluids, pain medicine, nausea medicine, and she is feeling better. I have discussed the patient with Dr. Marinelli and the hospitalist, and we will admit her for further evaluation and treatment.     Diagnosis:    ICD-10-CM    1. Acute biliary pancreatitis, unspecified complication status K85.10        Disposition: Admitted to medical bed.    I, Phoebe Winter, am serving as a scribe at 8:51 PM on 10/18/2019 to document services personally performed by Ishmael Chu MD based on my observations and the provider's statements to me.     Phoebe Winter  10/18/2019    EMERGENCY DEPARTMENT       Ishmael Chu MD  10/18/19 4623

## 2019-10-19 NOTE — ED TRIAGE NOTES
Pt had ERCP in clinic this morning. Pt states pain did not go away completely. Having abd pain, N/V and chest pains throughout the day.

## 2019-10-19 NOTE — PLAN OF CARE
3p-7p shift report  Liver enzymes and Lipase levels improving and trending down.  Abdomen obese but soft and non-tender. Denies any pain  WBC however is worse at 17.3 and trending up. Lungs diminished, no cough. Low grade temps. Temp 100 at 1620. Has + UA (MD's aware resulting in IV fluids changed to LR at 200/hr and IV Zosyn initiated) Results of UC still pending. States developed chills (but no rigors were noted by writer) at 1749 Temp 99.5. Is Bhutanese speaking - daughter at bedside all shift and interprets for pt. Pt & daughter refuse .Ambulates in room and to bathroom with SBA/walker/GB. No SANDY noted . Was in chair x1 for 20 minutes but prefers to lie in bed. Continue to monitor

## 2019-10-19 NOTE — H&P
Admitted:     10/18/2019      CHIEF COMPLAINT:  Abdominal pain.      HISTORY OF PRESENT ILLNESS:  Of note, the history was obtained with the help of son, who is fluent in English.  The patient is Korean-speaking only.      Natasha Vincent is a 68-year-old female, who is currently living with her daughter, with history of diabetes mellitus type 2, hypertension and hyperlipidemia, who presents to the emergency room with abdominal pain that started around 6:00 p.m. today.  This was associated with nausea, 2 episodes of emesis.  The patient apparently had an ERCP today with Dr. Marinelli at his [place] office and was discharged from the office at around 1:00 p.m.  She went home, did not have much to eat and took some rest; however, at 6:00 p.m., she started having central/left upper quadrant abdominal pain which was 10/10 in intensity.  The patient denies dyspnea.  No chest pain per se, although she did mention that initially when she had the abdominal pain, she had the pain going all the way to her retrosternum, but it has now since improved.  No dysuria, urinary urgency or frequency.  The patient apparently had a 7 mm bile duct stone, which was removed by Dr. Marinelli, and also had a pancreatic stent placed today.  No reported fever or chills.      In the emergency room, the patient was evaluated by Dr. Chu.  Basic lab work showed elevated bilirubin at 2.1, ALT was 313, and AST was 566.  Lipase was elevated at 1000.  Blood glucose was 193.  White cell count was 14.3.  The situation was discussed with Dr. Marinelli, who recommended admission to Hospitalist Service.  She received IV Dilaudid, with which he feels much better now.      PAST MEDICAL HISTORY:   1.  Benign essential hypertension.   2.  Hyperlipidemia.   3.  Insulin-requiring diabetes mellitus.      SOCIAL AND PERSONAL HISTORY:  She actually lives in [place].  She is here visiting her daughter and currently living with her.  No tobacco, alcohol or  recreational drug use.      FAMILY HISTORY:  Reviewed and is noncontributory.      HOME MEDICATIONS:    Prior to Admission Medications   Prescriptions Last Dose Informant Patient Reported? Taking?   atorvastatin (LIPITOR) 40 MG tablet 10/17/2019 at pm  Yes Yes   Sig: Take 40 mg by mouth At Bedtime    insulin glargine (LANTUS PEN) 100 UNIT/ML pen 10/17/2019 at Unknown time  Yes Yes   Sig: Inject 20 Units Subcutaneous 2 times daily   lisinopril-hydrochlorothiazide (PRINZIDE/ZESTORETIC) 10-12.5 MG tablet 10/17/2019 at am  Yes Yes   Sig: Take 1 tablet by mouth daily   metFORMIN (GLUCOPHAGE) 1000 MG tablet 10/17/2019 at pm  Yes Yes   Sig: Take 1,000 mg by mouth 2 times daily (with meals)   pantoprazole (PROTONIX) 40 MG EC tablet 10/18/2019 at am  Yes Yes   Sig: Take 40 mg by mouth daily      Facility-Administered Medications: None        REVIEW OF SYSTEMS:  A complete review of systems was done and was negative for anything else other than that mentioned in the HPI.      PHYSICAL EXAMINATION:   GENERAL:  Ms. Natasha Vincent is a 68-year-old female.  She is not in any overt distress.  She is a little sleepy and appears tired.   VITAL SIGNS:  Temperature 97.9, blood pressure 141/79, heart rate 84, respiration rate 16, O2 saturations 99% on room air.   HEENT:  Atraumatic, normocephalic, no pallor or icterus.   NECK:  Supple, with good range of motion.   RESPIRATORY:  Good air entry bilaterally with normal work of breathing.   CARDIOVASCULAR:  Regular rate and rhythm, no murmur.   ABDOMEN:  She is tender in the left upper quadrant.  No guarding, rebound or rigidity.  Bowel sounds normoactive.   EXTREMITIES:  Trace pedal edema.   SKIN:  Warm and dry.   NEUROLOGIC:  She is awake, alert, oriented.  Cranial nerves II-XII intact.  She does appear a little sleepy and tired but is responding appropriately.      LABORATORY AND DIAGNOSTIC DATA:  Normal electrolytes, bilirubin is elevated at 2.1, alkaline phosphatase is 328, ALT is  313, AST is 566.  Lipase is more than 1000.  Blood glucose is 193.  White cell count is 14.4.      ASSESSMENT AND PLAN:  Ms. Phillip Arriaga is a 68-year-old female with history of diabetes, hypertension and hyperlipidemia who underwent ERCP with stone extraction and pancreatic duct placement today as outpatient, presents with abdominal pain and is admitted with concerns for post-ERCP pancreatitis.   1.  Post-ERCP acute pancreatitis:  The patient presents about 5 or 6 hours after her ERCP with acute pain and has an elevated lipase and elevated transaminases and bilirubin.  Concern is post-ERCP pancreatitis.  She will be admitted to inpatient.  Normal saline at 125 mL per hour.  Pain control with IV Dilaudid.  Antiemetics as needed.  Dr. Marinelli has already been contacted from the emergency room, and he is aware.  He will evaluate the patient tomorrow morning.  I discussed with her that she could have clear liquids if she feels like it, as her pain is quite a bit improved after 1 dose of Dilaudid.  I will also place her on IV Protonix.   2.  Benign essential hypertension:  She was a little hypertensive in the emergency room, presumably from acute pain.  I will hold her hydrochlorothiazide and lisinopril for now.  We will have her on p.r.n. hydralazine until she is able to take properly orally.   3.  Insulin-requiring diabetes mellitus:  Prior to admission, she is on Glucophage 1000 mg twice daily and Lantus 20 units 2 times daily.  I will hold her metformin.  I will place her on high-intensity sliding scale.  Her Lantus can be resumed in the morning depending on how she is doing.      ANTICIPATED LENGTH OF STAY:  More than 2 midnights.      CODE STATUS:  Full code.         ANNALISA ARELLANO MD             D: 10/19/2019   T: 10/19/2019   MT: HENRIQUE      Name:     PHILLIP ARRIAGA   MRN:      [id number]        Account:      [id number]   :      1950        Admitted:     10/18/2019                    Document: [id number]

## 2019-10-19 NOTE — PLAN OF CARE
A&O x4. VSS on RA but pt requesting O2 for comfort, left on 1L overnight. Fully Malaysian speaking, family translating. Up with 1 and GB, continent in bathroom. Pain managed with dilaudid, given x1 this shift. Had episode of emesis when ambulating to bathroom, zofran given x1. PIV infusing NS @ 125. BG checks before meals and at bed. Gastro consulted to see this AM and formulate POC.

## 2019-10-20 ENCOUNTER — APPOINTMENT (OUTPATIENT)
Dept: GENERAL RADIOLOGY | Facility: CLINIC | Age: 69
DRG: 438 | End: 2019-10-20
Attending: HOSPITALIST
Payer: COMMERCIAL

## 2019-10-20 LAB
ALBUMIN SERPL-MCNC: 2.4 G/DL (ref 3.4–5)
ALP SERPL-CCNC: 328 U/L (ref 40–150)
ALT SERPL W P-5'-P-CCNC: 195 U/L (ref 0–50)
ANION GAP SERPL CALCULATED.3IONS-SCNC: 7 MMOL/L (ref 3–14)
AST SERPL W P-5'-P-CCNC: 99 U/L (ref 0–45)
BACTERIA SPEC CULT: NORMAL
BILIRUB SERPL-MCNC: 4.2 MG/DL (ref 0.2–1.3)
BUN SERPL-MCNC: 6 MG/DL (ref 7–30)
CALCIUM SERPL-MCNC: 8.1 MG/DL (ref 8.5–10.1)
CHLORIDE SERPL-SCNC: 112 MMOL/L (ref 94–109)
CO2 SERPL-SCNC: 24 MMOL/L (ref 20–32)
CREAT SERPL-MCNC: 0.61 MG/DL (ref 0.52–1.04)
ERYTHROCYTE [DISTWIDTH] IN BLOOD BY AUTOMATED COUNT: 14.3 % (ref 10–15)
GFR SERPL CREATININE-BSD FRML MDRD: >90 ML/MIN/{1.73_M2}
GLUCOSE BLDC GLUCOMTR-MCNC: 117 MG/DL (ref 70–99)
GLUCOSE BLDC GLUCOMTR-MCNC: 120 MG/DL (ref 70–99)
GLUCOSE BLDC GLUCOMTR-MCNC: 122 MG/DL (ref 70–99)
GLUCOSE BLDC GLUCOMTR-MCNC: 151 MG/DL (ref 70–99)
GLUCOSE SERPL-MCNC: 136 MG/DL (ref 70–99)
HCT VFR BLD AUTO: 34.9 % (ref 35–47)
HGB BLD-MCNC: 11.3 G/DL (ref 11.7–15.7)
LIPASE SERPL-CCNC: 283 U/L (ref 73–393)
Lab: NORMAL
MCH RBC QN AUTO: 28 PG (ref 26.5–33)
MCHC RBC AUTO-ENTMCNC: 32.4 G/DL (ref 31.5–36.5)
MCV RBC AUTO: 86 FL (ref 78–100)
PLATELET # BLD AUTO: 200 10E9/L (ref 150–450)
POTASSIUM SERPL-SCNC: 3.5 MMOL/L (ref 3.4–5.3)
PROT SERPL-MCNC: 5.9 G/DL (ref 6.8–8.8)
RBC # BLD AUTO: 4.04 10E12/L (ref 3.8–5.2)
SODIUM SERPL-SCNC: 143 MMOL/L (ref 133–144)
SPECIMEN SOURCE: NORMAL
WBC # BLD AUTO: 8 10E9/L (ref 4–11)

## 2019-10-20 PROCEDURE — 25000128 H RX IP 250 OP 636: Performed by: INTERNAL MEDICINE

## 2019-10-20 PROCEDURE — 36415 COLL VENOUS BLD VENIPUNCTURE: CPT | Performed by: INTERNAL MEDICINE

## 2019-10-20 PROCEDURE — 71046 X-RAY EXAM CHEST 2 VIEWS: CPT

## 2019-10-20 PROCEDURE — 85027 COMPLETE CBC AUTOMATED: CPT | Performed by: INTERNAL MEDICINE

## 2019-10-20 PROCEDURE — 00000146 ZZHCL STATISTIC GLUCOSE BY METER IP

## 2019-10-20 PROCEDURE — 12000000 ZZH R&B MED SURG/OB

## 2019-10-20 PROCEDURE — 80053 COMPREHEN METABOLIC PANEL: CPT | Performed by: INTERNAL MEDICINE

## 2019-10-20 PROCEDURE — 99232 SBSQ HOSP IP/OBS MODERATE 35: CPT | Performed by: HOSPITALIST

## 2019-10-20 PROCEDURE — 83690 ASSAY OF LIPASE: CPT | Performed by: INTERNAL MEDICINE

## 2019-10-20 PROCEDURE — 25000132 ZZH RX MED GY IP 250 OP 250 PS 637: Mod: GY | Performed by: INTERNAL MEDICINE

## 2019-10-20 PROCEDURE — C9113 INJ PANTOPRAZOLE SODIUM, VIA: HCPCS | Performed by: INTERNAL MEDICINE

## 2019-10-20 PROCEDURE — 25800030 ZZH RX IP 258 OP 636: Performed by: HOSPITALIST

## 2019-10-20 RX ORDER — SODIUM CHLORIDE 9 MG/ML
INJECTION, SOLUTION INTRAVENOUS CONTINUOUS
Status: DISCONTINUED | OUTPATIENT
Start: 2019-10-20 | End: 2019-10-21

## 2019-10-20 RX ADMIN — SODIUM CHLORIDE, PRESERVATIVE FREE: 5 INJECTION INTRAVENOUS at 09:06

## 2019-10-20 RX ADMIN — PIPERACILLIN SODIUM AND TAZOBACTAM SODIUM 3.38 G: 3; .375 INJECTION, POWDER, LYOPHILIZED, FOR SOLUTION INTRAVENOUS at 03:54

## 2019-10-20 RX ADMIN — SODIUM CHLORIDE, PRESERVATIVE FREE: 5 INJECTION INTRAVENOUS at 08:48

## 2019-10-20 RX ADMIN — ACETAMINOPHEN 650 MG: 325 TABLET, FILM COATED ORAL at 14:14

## 2019-10-20 RX ADMIN — PIPERACILLIN SODIUM AND TAZOBACTAM SODIUM 3.38 G: 3; .375 INJECTION, POWDER, LYOPHILIZED, FOR SOLUTION INTRAVENOUS at 22:21

## 2019-10-20 RX ADMIN — HYDROMORPHONE HYDROCHLORIDE 0.2 MG: 1 INJECTION, SOLUTION INTRAMUSCULAR; INTRAVENOUS; SUBCUTANEOUS at 09:04

## 2019-10-20 RX ADMIN — PANTOPRAZOLE SODIUM 40 MG: 40 INJECTION, POWDER, FOR SOLUTION INTRAVENOUS at 09:00

## 2019-10-20 RX ADMIN — PIPERACILLIN SODIUM AND TAZOBACTAM SODIUM 3.38 G: 3; .375 INJECTION, POWDER, LYOPHILIZED, FOR SOLUTION INTRAVENOUS at 09:08

## 2019-10-20 RX ADMIN — PIPERACILLIN SODIUM AND TAZOBACTAM SODIUM 3.38 G: 3; .375 INJECTION, POWDER, LYOPHILIZED, FOR SOLUTION INTRAVENOUS at 16:18

## 2019-10-20 RX ADMIN — SODIUM CHLORIDE, PRESERVATIVE FREE: 5 INJECTION INTRAVENOUS at 18:14

## 2019-10-20 ASSESSMENT — ACTIVITIES OF DAILY LIVING (ADL)
ADLS_ACUITY_SCORE: 10
ADLS_ACUITY_SCORE: 12
ADLS_ACUITY_SCORE: 10
ADLS_ACUITY_SCORE: 10
ADLS_ACUITY_SCORE: 12
ADLS_ACUITY_SCORE: 10

## 2019-10-20 ASSESSMENT — MIFFLIN-ST. JEOR: SCORE: 1370.25

## 2019-10-20 NOTE — PROGRESS NOTES
[facility]    Hospitalist Progress Note    Date of Service (when I saw the patient): 10/20/2019    Assessment & Plan   Natasha Vincent is a 68 year old female who was admitted on 10/18/2019.  ASSESSMENT AND PLAN:  Ms. Natasha Vincent is a 68-year-old female with history of diabetes, hypertension and hyperlipidemia who underwent ERCP with stone extraction and pancreatic duct placement today as outpatient, presents with abdominal pain and is admitted with concerns for post-ERCP pancreatitis.     Post-ERCP acute pancreatitis:    The patient presented on 10/18  about 5 or 6 hours after her ERCP with acute pain and has an elevated lipase and elevated transaminases and bilirubin.  Concern is post-ERCP pancreatitis.    Lipase 1004 on admsiison and 858 on 10/19.  Will check CT abdomen pelvis with contrast  from 10/19 grossly unremrkable for new acute pathology   leukocytosis improving  Remain febrile,  No clear source, on empiric Zosyn,  Checking CXR today(right  Lung base crackles on exam. Decreased IVF rate.  Advance diet to clear liquid diet  IVF at  decreased from 150ml/hr to 125mL/hr , changed from LR to NS for compatibility with abx.  Supportive treatment   GI consult placed with Dr.Bhatti mccollum     Benign essential hypertension:  She was a little hypertensive in the emergency room, presumably from acute pain.  I will hold her hydrochlorothiazide and lisinopril for now.  We will have her on p.r.n. hydralazine until she is able to take properly orally.     Insulin-requiring diabetes mellitus:  Prior to admission, she is on Glucophage 1000 mg twice daily and Lantus 20 units 2 times daily.  I will hold her metformin.  I will place her on high-intensity sliding scale.  Her Lantus can be resumed in the morning depending on how she is doing.   Blood sugars in the 100s today continue to monitor closely        CODE STATUS:  Full code.     DVT Prophylaxis: Pneumatic Compression Devices  Code Status: Full  Code    Disposition: Expected discharge in 2-3days once abdominal pain improves     Ramin Brown MD  [phone]      Interval History   Had an episode of fever last night, cultures pending. Pain mildly improved.  Had mild headache. passing gas,     -Data reviewed today: I reviewed all new labs and imaging results over the last 24 hours. I personally reviewed no images or EKG's today.    Physical Exam   Temp: 99.7  F (37.6  C) Temp src: Oral BP: (!) 159/73   Heart Rate: 89 Resp: 17 SpO2: 94 % O2 Device: None (Room air)    Vitals:    10/18/19 2211   Weight: 102.1 kg (225 lb)     Vital Signs with Ranges  Temp:  [99.5  F (37.5  C)-101  F (38.3  C)] 99.7  F (37.6  C)  Heart Rate:  [78-89] 89  Resp:  [16-18] 17  BP: (142-159)/(59-73) 159/73  SpO2:  [94 %-95 %] 94 %  I/O last 3 completed shifts:  In: 475 [I.V.:475]  Out: 4100 [Urine:4100]    Constitutional: Awake, alert, cooperative, no apparent distress  Respiratory: Fine crackles on right lung base  no wheezing  Cardiovascular: Regular rate and rhythm, normal S1 and S2, and no murmur noted  GI: Normal bowel sounds, soft, non-distended, RUQ tenderness+, obese   Skin/Integumen: No rashes, no cyanosis, no edema  Other:     Medications     sodium chloride         influenza Vac Split High-Dose  0.5 mL Intramuscular Prior to discharge     insulin aspart  1-7 Units Subcutaneous TID AC     insulin aspart  1-5 Units Subcutaneous At Bedtime     pantoprazole (PROTONIX) IV  40 mg Intravenous Daily with breakfast     piperacillin-tazobactam  3.375 g Intravenous Q6H       Data   Recent Labs   Lab 10/20/19  0747 10/19/19  0655 10/18/19  2104   WBC 8.0 17.3* 14.4*   HGB 11.3* 12.3 13.3   MCV 86 86 85    195 246    138 136   POTASSIUM 3.5 3.7 3.4   CHLORIDE 112* 108 102   CO2 24 25 27   BUN 6* 11 12   CR 0.61 0.51* 0.51*   ANIONGAP 7 5 7   SVETA 8.1* 8.4* 9.0   * 152* 193*   ALBUMIN 2.4* 2.8* 3.2*   PROTTOTAL 5.9* 6.6* 7.3   BILITOTAL 4.2* 3.8* 2.1*    ALKPHOS 328* 336* 328*   * 359* 313*   AST 99* 343* 566*   LIPASE 283 858* 1,004*       Recent Results (from the past 24 hour(s))   CT Abdomen Pelvis w Contrast    Narrative    CT ABDOMEN AND PELVIS WITH CONTRAST   10/19/2019 1:11 PM     HISTORY: Post ERCP abdominal pain and N/V and leucocytosis.    TECHNIQUE:  CT abdomen and pelvis with 113 mL [id number]     IV.  Radiation dose for this scan was reduced using automated exposure  control, adjustment of the mA and/or kV according to patient size, or  iterative reconstruction technique.    COMPARISON: None available.    FINDINGS:   Lung bases: Bibasilar atelectasis. Small hiatal hernia.    Abdomen/pelvis: No suspicious focal hepatic mass. Mild intra and extra  hepatic biliary dilatation likely reservoir effect post  cholecystectomy. Few foci of pneumobilia, likely related to recent  stent placement. The gallbladder is surgically absent. No  splenomegaly. Mild fatty replacement of the pancreas. There is a  pancreatic stent that extends from the pancreatic head into the second  portion of the duodenum through the ampulla of Vatter. Mild  peripancreatic fatty haziness near the pancreatic head and rogers  hepatis, could be related to recent procedure.    No abnormally dilated bowel loops. Colonic, predominantly sigmoid  diverticulosis without CT evidence of acute diverticular disease. No  significant free fluid in the abdomen or pelvis. No free peritoneal or  portal venous gas.    Bones and soft tissue: Moderate size fat-containing umbilical and  periumbilical hernia. Mild degenerative changes of the spine.      Impression    IMPRESSION:  1. Pancreatic stent extending from the pancreatic head into the second  portion of the duodenum through the ampulla. Mild peripancreatic fat  stranding predominantly around the pancreatic head and the rogers  hepatis, could be related to recent ERCP.  2. Mild intra and extrahepatic biliary dilatation, likely related to  the  reservoir effect postcholecystectomy. Few foci of pneumobilia,  likely related to recent ERCP.    KISHOR CARVER MD

## 2019-10-20 NOTE — PLAN OF CARE
3p-7p shift report  Liver enzymes and Lipase levels continue to improving and trending down.  Abdomen obese but soft and non-tender. Denies any pain  WBC improved from yesterday's  count of 17.3  to 8.0 today.  Fevers last night to T101 at 2300 but has been afebrile this shift.   New complaints of frontal HA  - received Dilaudid x1 and Tylenol x1.  SBP elevated in upper 150's. Writer noted that has not been on her home BP meds since admission. Hospitalist notified at 1400 re this - awaiting his reply  Abd obese, soft nontender. Started CL diet this afternoon and tolerated it well  Zosyn is not compatible with LR - MD notified and fluids changed to NS at 125/hr   Results of UC still pending. Is Haitian speaking - daughter at bedside all shift and interprets for pt. Pt & daughter refuse .Ambulates in room and to bathroom with SBA -pushing her IV pole.  No SANDY noted . Pt requesting private room since room too small too accommodate her visits from her 2 adult kids and she is unable to maneuver to maneuver safely thru room to get to bathroom  Continue to monitor .    Addendum: Still waiting to hear from Hospitalist re.  BP meds  Fevers last night to T101 at 2300 but has been afebrile this shift

## 2019-10-20 NOTE — PLAN OF CARE
A&O x4. VSS ex elevated BP's, tmax 101 overnight, no rigors. Denies pain and nausea, no pain or antiemetics needed. BC's drawn and pending, UC still pending. Up SBA, voiding large amounts frequently in bathroom, clear yellow urine. Strict NPO for bowel rest. No insulin coverage needed overnight. LR infusing @ 200 with int zosyn q6h. Plan for discharge pending clinical progress.

## 2019-10-20 NOTE — PROVIDER NOTIFICATION
MD notified regarding missed dose of zosyn, temps trending upward (100.8). Orders for BC's to be drawn and afterward to give first dose of zosyn and continue q6h.

## 2019-10-20 NOTE — PROGRESS NOTES
[facility]    Hospitalist Progress Note  Interval History   The abdominal pain has improved so as LFT. Pt has appetite. Pt's fever curve has improved and no fever in the last 16 hours.    All other review of systems are negative.    Assessment & Plan   Natasha Vincent is a 68 year old female who was admitted on 10/18/2019. For post-ERCP pancreatitis after removing a large stone which sphincterotomy, sphincterplasty was performed and PD stent was placed.    Problem: pancreatitis:  improved   -  Will just continue maintenance fluid    -  Will give a trial of clear liquid diet     Problem: fever and elevated LFT:    -  Currently de-feverish with zosyn    -  LFT improving besides Tbili elevation, but this could be a lagging response given pt clinically doing better    -  Please keep the pt NPO after MN and f/u LFT in the morning in case to repeat ERCP w/ stent placement, if pt tolerating diet today and LFT continues to improve then will cancel the case  - NPO after MN    Code Status: Full Code    -Data reviewed today: I reviewed all new labs and imaging results over the last 24 hours.     Physical Exam   Temp: 98  F (36.7  C) Temp src: Oral BP: (!) 145/72 Pulse: 80 Heart Rate: 69 Resp: 16 SpO2: 100 % O2 Device: None (Room air)    Vitals:    10/18/19 2211 10/20/19 0907   Weight: 102.1 kg (225 lb) 87.7 kg (193 lb 5.5 oz)     Vital Signs with Ranges  Temp:  [97.8  F (36.6  C)-101  F (38.3  C)] 98  F (36.7  C)  Pulse:  [80] 80  Heart Rate:  [69-89] 69  Resp:  [14-17] 16  BP: (144-159)/(49-76) 145/72  SpO2:  [94 %-100 %] 100 %  I/O last 3 completed shifts:  In: 1900 [P.O.:500; I.V.:1400]  Out: 4350 [Urine:4350]    Constitutional: Awake, alert, cooperative, no apparent distress  Respiratory: Clear to auscultation bilaterally, no crackles or wheezing  Cardiovascular: Regular rate and rhythm, normal S1 and S2, and no murmur noted  GI: Normal bowel sounds, soft, non-distended, non-tender  Skin/Integumen: No  rashes, no cyanosis, no edema  Other:     Venkatesh Isbell MD  (Luis Manuel)  [facility]  Office: [phone]  Cell: [phone], please feel free to call the cell    Medications     sodium chloride 125 mL/hr at 10/20/19 0906       influenza Vac Split High-Dose  0.5 mL Intramuscular Prior to discharge     insulin aspart  1-7 Units Subcutaneous TID AC     insulin aspart  1-5 Units Subcutaneous At Bedtime     pantoprazole (PROTONIX) IV  40 mg Intravenous Daily with breakfast     piperacillin-tazobactam  3.375 g Intravenous Q6H       Data   Recent Labs   Lab 10/20/19  0747 10/19/19  0655 10/18/19  2104   WBC 8.0 17.3* 14.4*   HGB 11.3* 12.3 13.3   MCV 86 86 85    195 246    138 136   POTASSIUM 3.5 3.7 3.4   CHLORIDE 112* 108 102   CO2 24 25 27   BUN 6* 11 12   CR 0.61 0.51* 0.51*   ANIONGAP 7 5 7   SVETA 8.1* 8.4* 9.0   * 152* 193*   ALBUMIN 2.4* 2.8* 3.2*   PROTTOTAL 5.9* 6.6* 7.3   BILITOTAL 4.2* 3.8* 2.1*   ALKPHOS 328* 336* 328*   * 359* 313*   AST 99* 343* 566*   LIPASE 283 858* 1,004*       Recent Results (from the past 24 hour(s))   XR Chest 2 Views    Narrative    XR CHEST 2 VW 10/20/2019 9:38 AM     HISTORY: Right lung base crackles on physical exam. Evaluate for  pneumonia.    COMPARISON: None.       Impression    Impression: Heart size is normal. There is a small amount of irregular  airspace opacity projecting over the left costophrenic angle, which  could represent subsegmental atelectasis, with early lingular  infiltrate not completely excluded. The pulmonary vasculature appears  normal. There is no pleural effusion or visible pneumothorax.  Multilevel degenerative disc disease in the thoracic spine.    MD Venkatesh RESENDIZ MD (Richard)  [facility]  Office: [phone]  Cell: [phone]

## 2019-10-20 NOTE — PROGRESS NOTES
Hospitalist service cross-cover note:     Paged regarding temperature of 101F.  Previously noted to be febrile with blood cultures sent and is on piperacillin-tazobactam IV for post ERCP pancreatitis with possible infection.  No antipyretics are needed unless patient is symptomatic from fever.  Added rectal acetaminophen as she is NPO.    Lyndon Lyles MD   Hospitalist  [phone]

## 2019-10-20 NOTE — PROVIDER NOTIFICATION
MD Notification    Notified Person: MD    Notified Person Name: JIMENEZ Brown    Notification Date/Time 2:00 PM    Notification Interaction: text paged    Purpose of Notification:    SBP elevated in upper 150's. Writer noted that has not been on her home BP meds since admission. Writer asking MD to consider re-ordering pt's home BP meds since she is no longer NPO. Pt also has frontal HA. Writer gave Tylenol but is asking for Excedrin prn if Tylenol not effective.     Orders Received: awaiting reply    Comments:

## 2019-10-21 VITALS
TEMPERATURE: 98 F | HEIGHT: 62 IN | HEART RATE: 80 BPM | WEIGHT: 195.3 LBS | OXYGEN SATURATION: 95 % | BODY MASS INDEX: 35.94 KG/M2 | DIASTOLIC BLOOD PRESSURE: 79 MMHG | SYSTOLIC BLOOD PRESSURE: 160 MMHG | RESPIRATION RATE: 16 BRPM

## 2019-10-21 LAB
ALBUMIN SERPL-MCNC: 2.2 G/DL (ref 3.4–5)
ALP SERPL-CCNC: 359 U/L (ref 40–150)
ALT SERPL W P-5'-P-CCNC: 160 U/L (ref 0–50)
ANION GAP SERPL CALCULATED.3IONS-SCNC: 6 MMOL/L (ref 3–14)
AST SERPL W P-5'-P-CCNC: 93 U/L (ref 0–45)
BILIRUB DIRECT SERPL-MCNC: 2.1 MG/DL (ref 0–0.2)
BILIRUB SERPL-MCNC: 3.1 MG/DL (ref 0.2–1.3)
BUN SERPL-MCNC: 3 MG/DL (ref 7–30)
CALCIUM SERPL-MCNC: 7.8 MG/DL (ref 8.5–10.1)
CHLORIDE SERPL-SCNC: 114 MMOL/L (ref 94–109)
CO2 SERPL-SCNC: 24 MMOL/L (ref 20–32)
CREAT SERPL-MCNC: 0.57 MG/DL (ref 0.52–1.04)
ERYTHROCYTE [DISTWIDTH] IN BLOOD BY AUTOMATED COUNT: 14.2 % (ref 10–15)
GFR SERPL CREATININE-BSD FRML MDRD: >90 ML/MIN/{1.73_M2}
GLUCOSE BLDC GLUCOMTR-MCNC: 139 MG/DL (ref 70–99)
GLUCOSE BLDC GLUCOMTR-MCNC: 147 MG/DL (ref 70–99)
GLUCOSE BLDC GLUCOMTR-MCNC: 169 MG/DL (ref 70–99)
GLUCOSE BLDC GLUCOMTR-MCNC: 174 MG/DL (ref 70–99)
GLUCOSE BLDC GLUCOMTR-MCNC: 204 MG/DL (ref 70–99)
GLUCOSE SERPL-MCNC: 152 MG/DL (ref 70–99)
HCT VFR BLD AUTO: 35 % (ref 35–47)
HGB BLD-MCNC: 11.2 G/DL (ref 11.7–15.7)
LIPASE SERPL-CCNC: 246 U/L (ref 73–393)
MCH RBC QN AUTO: 27.5 PG (ref 26.5–33)
MCHC RBC AUTO-ENTMCNC: 32 G/DL (ref 31.5–36.5)
MCV RBC AUTO: 86 FL (ref 78–100)
PLATELET # BLD AUTO: 204 10E9/L (ref 150–450)
POTASSIUM SERPL-SCNC: 3.4 MMOL/L (ref 3.4–5.3)
PROT SERPL-MCNC: 5.7 G/DL (ref 6.8–8.8)
RBC # BLD AUTO: 4.07 10E12/L (ref 3.8–5.2)
SODIUM SERPL-SCNC: 144 MMOL/L (ref 133–144)
WBC # BLD AUTO: 5.7 10E9/L (ref 4–11)

## 2019-10-21 PROCEDURE — 25000128 H RX IP 250 OP 636: Performed by: INTERNAL MEDICINE

## 2019-10-21 PROCEDURE — 90662 IIV NO PRSV INCREASED AG IM: CPT | Performed by: INTERNAL MEDICINE

## 2019-10-21 PROCEDURE — C9113 INJ PANTOPRAZOLE SODIUM, VIA: HCPCS | Performed by: INTERNAL MEDICINE

## 2019-10-21 PROCEDURE — 00000146 ZZHCL STATISTIC GLUCOSE BY METER IP

## 2019-10-21 PROCEDURE — 36415 COLL VENOUS BLD VENIPUNCTURE: CPT | Performed by: HOSPITALIST

## 2019-10-21 PROCEDURE — 80048 BASIC METABOLIC PNL TOTAL CA: CPT | Performed by: HOSPITALIST

## 2019-10-21 PROCEDURE — 83690 ASSAY OF LIPASE: CPT | Performed by: HOSPITALIST

## 2019-10-21 PROCEDURE — 99207 ZZC CDG-CODE INCORRECT PER BILLING BASED ON TIME: CPT | Performed by: HOSPITALIST

## 2019-10-21 PROCEDURE — 25000132 ZZH RX MED GY IP 250 OP 250 PS 637: Mod: GY | Performed by: HOSPITALIST

## 2019-10-21 PROCEDURE — 85027 COMPLETE CBC AUTOMATED: CPT | Performed by: HOSPITALIST

## 2019-10-21 PROCEDURE — 80076 HEPATIC FUNCTION PANEL: CPT | Performed by: HOSPITALIST

## 2019-10-21 PROCEDURE — 99239 HOSP IP/OBS DSCHRG MGMT >30: CPT | Performed by: HOSPITALIST

## 2019-10-21 PROCEDURE — 25800030 ZZH RX IP 258 OP 636: Performed by: HOSPITALIST

## 2019-10-21 RX ORDER — LEVOFLOXACIN 500 MG/1
500 TABLET, FILM COATED ORAL DAILY
Qty: 3 TABLET | Refills: 0 | Status: SHIPPED | OUTPATIENT
Start: 2019-10-21 | End: 2019-10-24

## 2019-10-21 RX ORDER — LISINOPRIL/HYDROCHLOROTHIAZIDE 10-12.5 MG
1 TABLET ORAL DAILY
Status: DISCONTINUED | OUTPATIENT
Start: 2019-10-21 | End: 2019-10-22 | Stop reason: HOSPADM

## 2019-10-21 RX ADMIN — LISINOPRIL AND HYDROCHLOROTHIAZIDE 1 TABLET: 12.5; 1 TABLET ORAL at 11:00

## 2019-10-21 RX ADMIN — PIPERACILLIN SODIUM AND TAZOBACTAM SODIUM 3.38 G: 3; .375 INJECTION, POWDER, LYOPHILIZED, FOR SOLUTION INTRAVENOUS at 21:12

## 2019-10-21 RX ADMIN — PIPERACILLIN SODIUM AND TAZOBACTAM SODIUM 3.38 G: 3; .375 INJECTION, POWDER, LYOPHILIZED, FOR SOLUTION INTRAVENOUS at 11:00

## 2019-10-21 RX ADMIN — PIPERACILLIN SODIUM AND TAZOBACTAM SODIUM 3.38 G: 3; .375 INJECTION, POWDER, LYOPHILIZED, FOR SOLUTION INTRAVENOUS at 04:27

## 2019-10-21 RX ADMIN — INSULIN ASPART 2 UNITS: 100 INJECTION, SOLUTION INTRAVENOUS; SUBCUTANEOUS at 14:06

## 2019-10-21 RX ADMIN — INSULIN ASPART 1 UNITS: 100 INJECTION, SOLUTION INTRAVENOUS; SUBCUTANEOUS at 17:58

## 2019-10-21 RX ADMIN — PIPERACILLIN SODIUM AND TAZOBACTAM SODIUM 3.38 G: 3; .375 INJECTION, POWDER, LYOPHILIZED, FOR SOLUTION INTRAVENOUS at 16:35

## 2019-10-21 RX ADMIN — SODIUM CHLORIDE, PRESERVATIVE FREE: 5 INJECTION INTRAVENOUS at 01:19

## 2019-10-21 RX ADMIN — INSULIN ASPART 1 UNITS: 100 INJECTION, SOLUTION INTRAVENOUS; SUBCUTANEOUS at 11:01

## 2019-10-21 RX ADMIN — PANTOPRAZOLE SODIUM 40 MG: 40 INJECTION, POWDER, FOR SOLUTION INTRAVENOUS at 08:54

## 2019-10-21 RX ADMIN — INFLUENZA A VIRUS A/MICHIGAN/45/2015 X-275 (H1N1) ANTIGEN (FORMALDEHYDE INACTIVATED), INFLUENZA A VIRUS A/SINGAPORE/INFIMH-16-0019/2016 IVR-186 (H3N2) ANTIGEN (FORMALDEHYDE INACTIVATED), AND INFLUENZA B VIRUS B/MARYLAND/15/2016 BX-69A (A B/COLORADO/6/2017-LIKE VIRUS) ANTIGEN (FORMALDEHYDE INACTIVATED) 0.5 ML: 60; 60; 60 INJECTION, SUSPENSION INTRAMUSCULAR at 13:09

## 2019-10-21 ASSESSMENT — MIFFLIN-ST. JEOR: SCORE: 1379.12

## 2019-10-21 ASSESSMENT — ACTIVITIES OF DAILY LIVING (ADL)
ADLS_ACUITY_SCORE: 12

## 2019-10-21 NOTE — DISCHARGE SUMMARY
[facility]  Hospitalist Discharge Summary       Date of Admission:  10/18/2019  Date of Discharge:  10/21/2019  Discharging Provider: Raimn Brown MD      Discharge Diagnoses   Patient Active Problem List   Diagnosis     Post-ERCP acute pancreatitis         Follow-ups Needed After Discharge   Follow-up Appointments     Follow-up and recommended labs and tests       Follow up with primary care provider, Physician No Ref-Primary, within 7   days for hospital follow- up.  No follow up labs or test are needed.         Additional follow-up instructions/to-do's for PCP; PCP    Unresulted Labs Ordered in the Past 30 Days of this Admission     Date and Time Order Name Status Description    10/19/2019 2101 Blood culture Preliminary     10/19/2019 2101 Blood culture Preliminary       These results will be followed up by PCP    Discharge Disposition   Discharged to home  Condition at discharge: Stable    Hospital Course   Natasha Vincent is a 68 year old female who was admitted on 10/18/2019.  ASSESSMENT AND PLAN:  Ms. Natasha Vincent is a 68-year-old female with history of diabetes, hypertension and hyperlipidemia who underwent ERCP with stone extraction and pancreatic duct placement today as outpatient, presents with abdominal pain and is admitted with concerns for post-ERCP pancreatitis.     Post-ERCP acute pancreatitis:    The patient presented on 10/18  about 5 or 6 hours after her ERCP with acute pain and has an elevated lipase and elevated transaminases and bilirubin.  Concern is post-ERCP pancreatitis.   Afebrile for 24 hours  - -  Possible PNA ( lingular infiltrate)  on empiric Zosyn while in the hospital discharged with 3 more days of Levoquine     Benign essential hypertension:   Suboptimally controled,   resume PTA lisinopril-hydrochlorothiazide.      Insulin-requiring diabetes mellitus:   Glycemic levels  Adequately controled on SSI ,  PTA antihyperglycemics include; Metformin  1000 mgs bid, and Lantus 20 units 2 times daily.   - - resume PTA  Metformin and Lantus     Ramin Brown MD  [phone]        Consultations This Hospital Stay   GASTROENTEROLOGY IP CONSULT    Code Status   Full Code    Time Spent on this Encounter   I, Ramin Brown MD, personally saw the patient today and spent greater than 30 minutes discharging this patient.       Ramin Brown MD  [facility]  ______________________________________________________________________    Physical Exam   Vital Signs: Temp: 98  F (36.7  C) Temp src: Oral BP: (!) 160/79   Heart Rate: 69 Resp: 16 SpO2: 95 % O2 Device: None (Room air)    Weight: 195 lbs 4.8 oz  Constitutional: Awake, alert, cooperative, no apparent distress  Respiratory: Fine crackles on right lung base  no wheezing  Cardiovascular: Regular rate and rhythm, normal S1 and S2, and no murmur noted  GI: Normal bowel sounds, soft, non-distended,no tenderness, + obese   Skin/Integumen: No rashes, no cyanosis, no edema       Primary Care Physician   Physician No Ref-Primary    Discharge Orders      Reason for your hospital stay    Pancreatitis     Follow-up and recommended labs and tests     Follow up with primary care provider, Physician No Ref-Primary, within 7 days for hospital follow- up.  No follow up labs or test are needed.     Activity    Your activity upon discharge: activity as tolerated     Full Code     Diet    Follow this diet upon discharge: Orders Placed This Encounter      Regular Diet Adult       Significant Results and Procedures   Most Recent 3 CBC's:  Recent Labs   Lab Test 10/21/19  0730 10/20/19  0747 10/19/19  0655   WBC 5.7 8.0 17.3*   HGB 11.2* 11.3* 12.3   MCV 86 86 86    200 195       Discharge Medications   Current Discharge Medication List      START taking these medications    Details   levofloxacin (LEVAQUIN) 500 MG tablet Take 1 tablet (500 mg) by mouth daily for 3 days  Qty: 3 tablet,  Refills: 0    Associated Diagnoses: Acute biliary pancreatitis, unspecified complication status; 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        lisinopril-hydrochlorothiazide (PRINZIDE/ZESTORETIC) 10-12.5 MG tablet Take 1 tablet by mouth daily      metFORMIN (GLUCOPHAGE) 1000 MG tablet Take 1,000 mg by mouth 2 times daily (with meals)      pantoprazole (PROTONIX) 40 MG EC tablet Take 40 mg by mouth daily           Allergies   No Known Allergies

## 2019-10-21 NOTE — PROGRESS NOTES
[facility]  Gastroenterology Progress Note     Natasha Vincent MRN# [id number]   YOB: 1950 Age: 68 year old          Assessment and Plan:   Patient has had complete resolution of abdominal pain. Has been afebrile.      Post-ERCP acute pancreatitis  Natasha Vincent is a 68 year old female who was admitted on 10/18/2019. For post-ERCP pancreatitis after removing a large stone which sphincterotomy, sphincterplasty was performed and PD stent was placed.     -Tolerated clear liquid diet will advance to regular diet.   -If has no increase in pain and abdominal pain remains minimal likely plan discharge later today or tomorrow     Fever and elevated LFTs:    -  Currently afebrile with zosyn    -  LFT improving besides Tbili elevation, but this could be a lagging response given pt clinically doing better    -Will need to go home on oral antibiotics with metronidazole 500 mg TID and Cipro 500 mg BID for 7 days.           Interval History:   no new complaints, doing well, denies chest pain, denies shortness of breath, denies abdominal pain, alert, oriented to person, place and time and doing well; no cp, sob, n/v/d, or abd pain.              Review of Systems:   C: NEGATIVE for fever, chills, change in weight  E/M: NEGATIVE for ear, mouth and throat problems  R: NEGATIVE for significant cough or SOB  CV: NEGATIVE for chest pain, palpitations or peripheral edema             Medications:   I have reviewed this patient's current medications    influenza Vac Split High-Dose  0.5 mL Intramuscular Prior to discharge     insulin aspart  1-7 Units Subcutaneous TID AC     insulin aspart  1-5 Units Subcutaneous At Bedtime     lisinopril-hydrochlorothiazide  1 tablet Oral Daily     pantoprazole (PROTONIX) IV  40 mg Intravenous Daily with breakfast     piperacillin-tazobactam  3.375 g Intravenous Q6H                  Physical Exam:   Vitals were reviewed  Vital Signs with Ranges  Temp:  [97.8  F  (36.6  C)-98.8  F (37.1  C)] 98.1  F (36.7  C)  Heart Rate:  [68-69] 68  Resp:  [16-18] 18  BP: (138-159)/(58-76) 152/58  SpO2:  [95 %-100 %] 95 %  I/O last 3 completed shifts:  In: 3633 [P.O.:740; I.V.:2893]  Out: 2550 [Urine:2550]  Constitutional: healthy, alert and no distress   Cardiovascular: negative, PMI normal. No lifts, heaves, or thrills. RRR. No murmurs, clicks gallops or rub  Respiratory: negative, Percussion normal. Good diaphragmatic excursion. Lungs clear  Head: Normocephalic. No masses, lesions, tenderness or abnormalities  Neck: Neck supple. No adenopathy. Thyroid symmetric, normal size,, Carotids without bruits.  Abdomen: Abdomen soft, non-tender. BS normal. No masses, organomegaly             Data:   I reviewed the patient's new clinical lab test results.   Recent Labs   Lab Test 10/21/19  0730 10/20/19  0747 10/19/19  0655   WBC 5.7 8.0 17.3*   HGB 11.2* 11.3* 12.3   MCV 86 86 86    200 195     Recent Labs   Lab Test 10/21/19  0730 10/20/19  0747 10/19/19  0655   POTASSIUM 3.4 3.5 3.7   CHLORIDE 114* 112* 108   CO2 24 24 25   BUN 3* 6* 11   ANIONGAP 6 7 5     Recent Labs   Lab Test 10/21/19  0730 10/20/19  0747 10/19/19  1326 10/19/19  0655   ALBUMIN 2.2* 2.4*  --  2.8*   BILITOTAL 3.1* 4.2*  --  3.8*   * 195*  --  359*   AST 93* 99*  --  343*   PROTEIN  --   --  10*  --    LIPASE 246 283  --  858*       I reviewed the patient's new imaging results.    All laboratory data reviewed  All imaging studies reviewed by me.    Mago Diggs PA-C,  10/21/2019  [facility]  Office : [phone]  Cell: [phone] (Dr. Marinelli)  Cell: [phone] (Mago Diggs PA-C)

## 2019-10-21 NOTE — PLAN OF CARE
A&O, VSS.  Slight htn but home med antihypertensives restarted today.  Denies pain.  In the process of trying a regular diet.  BGM stable, coverage given as needed.  Family at bedside and translating for patient.  PIV SL.  Voiding adequately.  No BM reported since Friday.  LFTs improved, so ERCP scheduled for today was cancelled.  Up with SBA/family.  Plan to discharge home this evening on PO abx if pt is tolerating diet.

## 2019-10-21 NOTE — PLAN OF CARE
Pt A&O x4. VSS ex elevated BP's, afebrile. No c/o pain/nausea this shift. Clear liquid diet, tolerating well. Up SBA, steady. PIV infu NS @125mL/hr.

## 2019-10-21 NOTE — PROGRESS NOTES
[facility]    Hospitalist Progress Note    Date of Service (when I saw the patient): 10/21/2019    Assessment & Plan   Natasha Vincent is a 68 year old female who was admitted on 10/18/2019.  ASSESSMENT AND PLAN:  Ms. Natasha Vincent is a 68-year-old female with history of diabetes, hypertension and hyperlipidemia who underwent ERCP with stone extraction and pancreatic duct placement today as outpatient, presents with abdominal pain and is admitted with concerns for post-ERCP pancreatitis.     Post-ERCP acute pancreatitis:    The patient presented on 10/18  about 5 or 6 hours after her ERCP with acute pain and has an elevated lipase and elevated transaminases and bilirubin.  Concern is post-ERCP pancreatitis.   Afebrile for 24 hours  - -  Possible PNA ( lingular infiltrate)  on empiric Zosyn,   - - Advance diet as tolereated,  - -  Discontinue IVF   - - Supportive treatment   - - GI consult follwing    Benign essential hypertension:   Suboptimally controled,   resume PTA lisinopril-hydrochlorothiazide.      Insulin-requiring diabetes mellitus:   Glycemic levels  Adequately controled on SSI ,  PTA antihyperglycemics include; Metformin 1000 mgs bid, and Lantus 20 units 2 times daily.   - - Hold Metformin and Lantus  due to NPO  - - Continue Sliding scale insulin        CODE STATUS:  Full code.     DVT Prophylaxis: Pneumatic Compression Devices  Code Status: Full Code    Disposition: Expected discharge likely later today or tomorrow pending GI input    Ramin Brown MD  [phone]      Interval History   remained afebrile last night, pain completley resolved, cough  has improved. No chest pain or palpitations.  cultures pending. Pain mildly improved.  Had mild headache. passing gas,     -Data reviewed today: I reviewed all new labs and imaging results over the last 24 hours. I personally reviewed no images or EKG's today.    Physical Exam   Temp: 98.1  F (36.7  C) Temp src: Oral BP:  (!) 152/58 Pulse: 80 Heart Rate: 68 Resp: 18 SpO2: 95 % O2 Device: None (Room air)    Vitals:    10/18/19 2211 10/20/19 0907 10/21/19 0220   Weight: 102.1 kg (225 lb) 87.7 kg (193 lb 5.5 oz) 88.6 kg (195 lb 4.8 oz)     Vital Signs with Ranges  Temp:  [97.8  F (36.6  C)-98.9  F (37.2  C)] 98.1  F (36.7  C)  Pulse:  [80] 80  Heart Rate:  [68-69] 68  Resp:  [16-18] 18  BP: (138-159)/(49-76) 152/58  SpO2:  [95 %-100 %] 95 %  I/O last 3 completed shifts:  In: 3633 [P.O.:740; I.V.:2893]  Out: 2550 [Urine:2550]    Constitutional: Awake, alert, cooperative, no apparent distress  Respiratory: Fine crackles on right lung base  no wheezing  Cardiovascular: Regular rate and rhythm, normal S1 and S2, and no murmur noted  GI: Normal bowel sounds, soft, non-distended,no tenderness, + obese   Skin/Integumen: No rashes, no cyanosis, no edema  Other:     Medications     sodium chloride 125 mL/hr at 10/21/19 0119       influenza Vac Split High-Dose  0.5 mL Intramuscular Prior to discharge     insulin aspart  1-7 Units Subcutaneous TID AC     insulin aspart  1-5 Units Subcutaneous At Bedtime     pantoprazole (PROTONIX) IV  40 mg Intravenous Daily with breakfast     piperacillin-tazobactam  3.375 g Intravenous Q6H       Data   Recent Labs   Lab 10/21/19  0730 10/20/19  0747 10/19/19  0655   WBC 5.7 8.0 17.3*   HGB 11.2* 11.3* 12.3   MCV 86 86 86    200 195    143 138   POTASSIUM 3.4 3.5 3.7   CHLORIDE 114* 112* 108   CO2 PENDING 24 25   BUN PENDING 6* 11   CR PENDING 0.61 0.51*   ANIONGAP PENDING 7 5   SVETA PENDING 8.1* 8.4*   GLC PENDING 136* 152*   ALBUMIN  --  2.4* 2.8*   PROTTOTAL  --  5.9* 6.6*   BILITOTAL  --  4.2* 3.8*   ALKPHOS  --  328* 336*   ALT  --  195* 359*   AST  --  99* 343*   LIPASE  --  283 858*       Recent Results (from the past 24 hour(s))   XR Chest 2 Views    Narrative    XR CHEST 2 VW 10/20/2019 9:38 AM     HISTORY: Right lung base crackles on physical exam. Evaluate for  pneumonia.    COMPARISON:  None.       Impression    Impression: Heart size is normal. There is a small amount of irregular  airspace opacity projecting over the left costophrenic angle, which  could represent subsegmental atelectasis, with early lingular  infiltrate not completely excluded. The pulmonary vasculature appears  normal. There is no pleural effusion or visible pneumothorax.  Multilevel degenerative disc disease in the thoracic spine.    DIANA JOHNSON MD

## 2019-10-21 NOTE — PLAN OF CARE
Pt A+Ox4. VSS on RA. Denies Pain. Moving well IND with SBA for safety. Luxem[place] speaking, understands small amounts of english. Voiding Well. NPO since 0000 for possible repeat ERCP. Will continue to monitor.

## 2019-10-22 NOTE — PLAN OF CARE
A&O, VSS on RA, LS clear, BS hypoactive, nausea resolved, regular diet tolerated well, continent for both B and B, CMS intact, up independent, denies pain. Discharge education done, patient and family acknowledged understanding.

## 2019-10-26 LAB
BACTERIA SPEC CULT: NO GROWTH
BACTERIA SPEC CULT: NO GROWTH
Lab: NORMAL
Lab: NORMAL
SPECIMEN SOURCE: NORMAL
SPECIMEN SOURCE: NORMAL

## 2020-09-30 NOTE — PROGRESS NOTES
Are you able to send something in? [facility]    Hospitalist Progress Note    Date of Service (when I saw the patient): 10/19/2019    Assessment & Plan   Natasha Vincent is a 68 year old female who was admitted on 10/18/2019.  ASSESSMENT AND PLAN:  Ms. Natasha Vincent is a 68-year-old female with history of diabetes, hypertension and hyperlipidemia who underwent ERCP with stone extraction and pancreatic duct placement today as outpatient, presents with abdominal pain and is admitted with concerns for post-ERCP pancreatitis.   1.  Post-ERCP acute pancreatitis:    The patient presented on 10/18  about 5 or 6 hours after her ERCP with acute pain and has an elevated lipase and elevated transaminases and bilirubin.  Concern is post-ERCP pancreatitis.    Lipase 1004 on admsiison and 858 today   Pt c/o RUQ pain and tenderness   Will check CT abdomen pelvis with contrast today   WBC count at 14.4K yesterday and 17.3 today   Make her NPO   IVF at 150ml/hr   Supportive treatment   GI consult placed with Dr.Bhatti mccollum     2.  Benign essential hypertension:  She was a little hypertensive in the emergency room, presumably from acute pain.  I will hold her hydrochlorothiazide and lisinopril for now.  We will have her on p.r.n. hydralazine until she is able to take properly orally.     3.  Insulin-requiring diabetes mellitus:  Prior to admission, she is on Glucophage 1000 mg twice daily and Lantus 20 units 2 times daily.  I will hold her metformin.  I will place her on high-intensity sliding scale.  Her Lantus can be resumed in the morning depending on how she is doing.   Blood sugars in the 100s today continue to monitor closely        CODE STATUS:  Full code.     DVT Prophylaxis: Pneumatic Compression Devices  Code Status: Full Code    Disposition: Expected discharge in 2-3days once abdominal pain improves     Petra Abdul MD  [phone] (P)      Interval History   C/o RUQ abdominal pain and tenderness. Nausea improved. No f/c      -Data reviewed today: I reviewed all new labs and imaging results over the last 24 hours. I personally reviewed no images or EKG's today.    Physical Exam   Temp: 99.3  F (37.4  C) Temp src: Oral BP: (!) 142/59 Pulse: 85 Heart Rate: 72 Resp: 16 SpO2: 94 % O2 Device: None (Room air) Oxygen Delivery: 1.5 LPM  Vitals:    10/18/19 2211   Weight: 102.1 kg (225 lb)     Vital Signs with Ranges  Temp:  [97.3  F (36.3  C)-99.3  F (37.4  C)] 99.3  F (37.4  C)  Pulse:  [72-87] 85  Heart Rate:  [72-84] 72  Resp:  [10-18] 16  BP: (136-178)/(58-83) 142/59  SpO2:  [94 %-99 %] 94 %  I/O last 3 completed shifts:  In: 1000 [IV Piggyback:1000]  Out: -     Constitutional: Awake, alert, cooperative, no apparent distress  Respiratory: Clear to auscultation bilaterally, no crackles or wheezing  Cardiovascular: Regular rate and rhythm, normal S1 and S2, and no murmur noted  GI: Normal bowel sounds, soft, non-distended, RUQ tenderness+, obese   Skin/Integumen: No rashes, no cyanosis, no edema  Other:     Medications     sodium chloride 150 mL/hr at 10/19/19 1208       [START ON 10/20/2019] influenza Vac Split High-Dose  0.5 mL Intramuscular Prior to discharge     insulin aspart  1-7 Units Subcutaneous TID AC     insulin aspart  1-5 Units Subcutaneous At Bedtime     pantoprazole (PROTONIX) IV  40 mg Intravenous Daily with breakfast       Data   Recent Labs   Lab 10/19/19  0655 10/18/19  2104   WBC 17.3* 14.4*   HGB 12.3 13.3   MCV 86 85    246    136   POTASSIUM 3.7 3.4   CHLORIDE 108 102   CO2 25 27   BUN 11 12   CR 0.51* 0.51*   ANIONGAP 5 7   SVETA 8.4* 9.0   * 193*   ALBUMIN 2.8* 3.2*   PROTTOTAL 6.6* 7.3   BILITOTAL 3.8* 2.1*   ALKPHOS 336* 328*   * 313*   * 566*   LIPASE 858* 1,004*       No results found for this or any previous visit (from the past 24 hour(s)).